# Patient Record
Sex: MALE | Race: BLACK OR AFRICAN AMERICAN | Employment: UNEMPLOYED | URBAN - METROPOLITAN AREA
[De-identification: names, ages, dates, MRNs, and addresses within clinical notes are randomized per-mention and may not be internally consistent; named-entity substitution may affect disease eponyms.]

---

## 2018-12-07 ENCOUNTER — HOSPITAL ENCOUNTER (OUTPATIENT)
Age: 48
Discharge: HOME | End: 2018-12-09
Payer: COMMERCIAL

## 2018-12-07 ENCOUNTER — HOSPITAL ENCOUNTER (OUTPATIENT)
Dept: GENERAL RADIOLOGY | Age: 48
Discharge: HOME | End: 2018-12-09
Payer: COMMERCIAL

## 2018-12-07 ENCOUNTER — HOSPITAL ENCOUNTER (OUTPATIENT)
Age: 48
Discharge: HOME | End: 2018-12-07
Payer: COMMERCIAL

## 2018-12-07 DIAGNOSIS — R52 PAIN: ICD-10-CM

## 2018-12-07 LAB
ALBUMIN SERPL-MCNC: 4.4 G/DL (ref 3.5–5.2)
ALP BLD-CCNC: 74 U/L (ref 40–129)
ALT SERPL-CCNC: 32 U/L (ref 0–40)
ANION GAP SERPL CALCULATED.3IONS-SCNC: 11 MMOL/L (ref 7–16)
AST SERPL-CCNC: 27 U/L (ref 0–39)
BASOPHILS ABSOLUTE: 0.05 E9/L (ref 0–0.2)
BASOPHILS RELATIVE PERCENT: 0.5 % (ref 0–2)
BILIRUB SERPL-MCNC: 0.5 MG/DL (ref 0–1.2)
BUN BLDV-MCNC: 9 MG/DL (ref 6–20)
CALCIUM SERPL-MCNC: 9.1 MG/DL (ref 8.6–10.2)
CHLORIDE BLD-SCNC: 106 MMOL/L (ref 98–107)
CHOLESTEROL, FASTING: 174 MG/DL (ref 0–199)
CO2: 24 MMOL/L (ref 22–29)
CREAT SERPL-MCNC: 1.1 MG/DL (ref 0.7–1.2)
EOSINOPHILS ABSOLUTE: 0.11 E9/L (ref 0.05–0.5)
EOSINOPHILS RELATIVE PERCENT: 1 % (ref 0–6)
GFR AFRICAN AMERICAN: >60
GFR NON-AFRICAN AMERICAN: >60 ML/MIN/1.73
GLUCOSE FASTING: 81 MG/DL (ref 74–99)
HCT VFR BLD CALC: 46.7 % (ref 37–54)
HDLC SERPL-MCNC: 50 MG/DL
HEMOGLOBIN: 15.5 G/DL (ref 12.5–16.5)
IMMATURE GRANULOCYTES #: 0.03 E9/L
IMMATURE GRANULOCYTES %: 0.3 % (ref 0–5)
LDL CHOLESTEROL CALCULATED: 108 MG/DL (ref 0–99)
LYMPHOCYTES ABSOLUTE: 3.03 E9/L (ref 1.5–4)
LYMPHOCYTES RELATIVE PERCENT: 27.8 % (ref 20–42)
MCH RBC QN AUTO: 26.8 PG (ref 26–35)
MCHC RBC AUTO-ENTMCNC: 33.2 % (ref 32–34.5)
MCV RBC AUTO: 80.7 FL (ref 80–99.9)
MONOCYTES ABSOLUTE: 0.82 E9/L (ref 0.1–0.95)
MONOCYTES RELATIVE PERCENT: 7.5 % (ref 2–12)
NEUTROPHILS ABSOLUTE: 6.84 E9/L (ref 1.8–7.3)
NEUTROPHILS RELATIVE PERCENT: 62.9 % (ref 43–80)
PDW BLD-RTO: 13.9 FL (ref 11.5–15)
PLATELET # BLD: 304 E9/L (ref 130–450)
PMV BLD AUTO: 9.7 FL (ref 7–12)
POTASSIUM SERPL-SCNC: 4.4 MMOL/L (ref 3.5–5)
RBC # BLD: 5.79 E12/L (ref 3.8–5.8)
SODIUM BLD-SCNC: 141 MMOL/L (ref 132–146)
TOTAL PROTEIN: 7.7 G/DL (ref 6.4–8.3)
TRIGLYCERIDE, FASTING: 80 MG/DL (ref 0–149)
VLDLC SERPL CALC-MCNC: 16 MG/DL
WBC # BLD: 10.9 E9/L (ref 4.5–11.5)

## 2018-12-07 PROCEDURE — 85025 COMPLETE CBC W/AUTO DIFF WBC: CPT

## 2018-12-07 PROCEDURE — G0103 PSA SCREENING: HCPCS

## 2018-12-07 PROCEDURE — 73030 X-RAY EXAM OF SHOULDER: CPT

## 2018-12-07 PROCEDURE — 71046 X-RAY EXAM CHEST 2 VIEWS: CPT

## 2018-12-07 PROCEDURE — 80053 COMPREHEN METABOLIC PANEL: CPT

## 2018-12-07 PROCEDURE — 36415 COLL VENOUS BLD VENIPUNCTURE: CPT

## 2018-12-07 PROCEDURE — 80061 LIPID PANEL: CPT

## 2018-12-08 LAB — PROSTATE SPECIFIC ANTIGEN: 1.16 NG/ML (ref 0–4)

## 2018-12-19 ENCOUNTER — HOSPITAL ENCOUNTER (OUTPATIENT)
Dept: CT IMAGING | Age: 48
Discharge: HOME | End: 2018-12-21
Payer: COMMERCIAL

## 2018-12-19 DIAGNOSIS — C34.00 MALIGNANT NEOPLASM OF MAIN BRONCHUS, UNSPECIFIED LATERALITY (HCC): ICD-10-CM

## 2018-12-19 PROCEDURE — 6360000004 HC RX CONTRAST MEDICATION: Performed by: PHYSICIAN ASSISTANT

## 2018-12-19 PROCEDURE — 71260 CT THORAX DX C+: CPT

## 2018-12-19 PROCEDURE — 2580000003 HC RX 258: Performed by: FAMILY MEDICINE

## 2018-12-19 RX ORDER — SODIUM CHLORIDE 0.9 % (FLUSH) 0.9 %
10 SYRINGE (ML) INJECTION PRN
Status: DISCONTINUED | OUTPATIENT
Start: 2018-12-19 | End: 2018-12-22 | Stop reason: HOSPADM

## 2018-12-19 RX ADMIN — Medication 10 ML: at 11:12

## 2018-12-19 RX ADMIN — IOPAMIDOL 90 ML: 755 INJECTION, SOLUTION INTRAVENOUS at 11:11

## 2018-12-28 ENCOUNTER — OFFICE VISIT (OUTPATIENT)
Dept: PULMONOLOGY | Age: 48
End: 2018-12-28
Payer: COMMERCIAL

## 2018-12-28 ENCOUNTER — TELEPHONE (OUTPATIENT)
Dept: PULMONOLOGY | Age: 48
End: 2018-12-28

## 2018-12-28 ENCOUNTER — HOSPITAL ENCOUNTER (OUTPATIENT)
Age: 48
Discharge: HOME | End: 2018-12-28
Payer: COMMERCIAL

## 2018-12-28 DIAGNOSIS — C34.11 PANCOAST TUMOR OF RIGHT LUNG (HCC): Primary | ICD-10-CM

## 2018-12-28 DIAGNOSIS — I20.0 UNSTABLE ANGINA PECTORIS (HCC): ICD-10-CM

## 2018-12-28 DIAGNOSIS — R05.9 COUGH: ICD-10-CM

## 2018-12-28 DIAGNOSIS — R91.8 LUNG MASS: ICD-10-CM

## 2018-12-28 DIAGNOSIS — F17.200 SMOKER: ICD-10-CM

## 2018-12-28 DIAGNOSIS — C34.11 PANCOAST TUMOR OF RIGHT LUNG (HCC): ICD-10-CM

## 2018-12-28 DIAGNOSIS — R91.8 LUNG MASS: Primary | ICD-10-CM

## 2018-12-28 PROBLEM — R07.9 CHEST PAIN: Status: ACTIVE | Noted: 2018-12-28

## 2018-12-28 LAB
DLCO %PRED: NORMAL %
DLCO PRED: 36.37 ML/MIN/MMHG
DLCO/VA %PRED: NORMAL %
DLCO/VA PRED: 4.81 ML/MIN/MMHG
DLCO/VA: 3.9 ML/MIN/MMHG
DLCO: 27.63 ML/MIN/MMHG
EXPIRATORY TIME-POST: 7.5 SEC
EXPIRATORY TIME: 7.75 SEC
FEF 25-75% %CHNG: NORMAL
FEF 25-75% %PRED-POST: NORMAL %
FEF 25-75% %PRED-PRE: NORMAL L/SEC
FEF 25-75% PRED: 4.02 L/SEC
FEF 25-75%-POST: 3.24 L/SEC
FEF 25-75%-PRE: 3.17 L/SEC
FEV1 %PRED-POST: NORMAL %
FEV1 %PRED-PRE: NORMAL %
FEV1 PRED: 4.41 L
FEV1-POST: 3.61 L
FEV1-PRE: 3.8 L
FEV1/FVC %PRED-POST: NORMAL %
FEV1/FVC %PRED-PRE: NORMAL %
FEV1/FVC PRED: 79 %
FEV1/FVC-POST: 71 %
FEV1/FVC-PRE: 77 %
FVC %PRED-POST: NORMAL L
FVC %PRED-PRE: NORMAL %
FVC PRED: 5.61 L
FVC-POST: 5.08 L
FVC-PRE: 4.91 L
GAW %PRED: NORMAL %
GAW PRED: NORMAL L/S/CMH2O
GAW: NORMAL L/S/CMH2O
IC %PRED: NORMAL %
IC PRED: 3.73 L
IC: 3.44 L
INR BLD: 1
MVV %PRED-PRE: NORMAL %
MVV PRED: 167 L/MIN
MVV-PRE: 158 L/MIN
PEF %PRED-POST: NORMAL %
PEF %PRED-PRE: NORMAL L/SEC
PEF PRED: NORMAL L/SEC
PEF%CHNG: NORMAL
PEF-POST: NORMAL L/SEC
PEF-PRE: NORMAL L/SEC
PROTHROMBIN TIME: 11.6 SEC (ref 9.3–12.4)
RAW %PRED: NORMAL %
RAW PRED: NORMAL CMH2O/L/S
RAW: NORMAL CMH2O/L/S
RV %PRED: NORMAL %
RV PRED: 2.14 L
RV: 4.43 L
SVC %PRED: NORMAL %
SVC PRED: 5.61 L
SVC: 4.88 L
TLC %PRED: NORMAL %
TLC PRED: 2.14 L
TLC: 9.58 L
VA %PRED: NORMAL %
VA PRED: 7.56 L
VA: 7.09 L
VTG %PRED: NORMAL %
VTG PRED: NORMAL L
VTG: NORMAL L

## 2018-12-28 PROCEDURE — 4004F PT TOBACCO SCREEN RCVD TLK: CPT | Performed by: INTERNAL MEDICINE

## 2018-12-28 PROCEDURE — G8427 DOCREV CUR MEDS BY ELIG CLIN: HCPCS | Performed by: INTERNAL MEDICINE

## 2018-12-28 PROCEDURE — 99205 OFFICE O/P NEW HI 60 MIN: CPT | Performed by: INTERNAL MEDICINE

## 2018-12-28 PROCEDURE — G8484 FLU IMMUNIZE NO ADMIN: HCPCS | Performed by: INTERNAL MEDICINE

## 2018-12-28 PROCEDURE — 36415 COLL VENOUS BLD VENIPUNCTURE: CPT

## 2018-12-28 PROCEDURE — 85610 PROTHROMBIN TIME: CPT

## 2018-12-28 PROCEDURE — 99203 OFFICE O/P NEW LOW 30 MIN: CPT | Performed by: INTERNAL MEDICINE

## 2018-12-28 PROCEDURE — G8599 NO ASA/ANTIPLAT THER USE RNG: HCPCS | Performed by: INTERNAL MEDICINE

## 2018-12-28 PROCEDURE — G8421 BMI NOT CALCULATED: HCPCS | Performed by: INTERNAL MEDICINE

## 2018-12-28 PROCEDURE — 94060 EVALUATION OF WHEEZING: CPT | Performed by: INTERNAL MEDICINE

## 2018-12-28 RX ORDER — OXYCODONE HYDROCHLORIDE AND ACETAMINOPHEN 5; 325 MG/1; MG/1
1 TABLET ORAL EVERY 6 HOURS PRN
Qty: 60 TABLET | Refills: 0 | Status: SHIPPED | OUTPATIENT
Start: 2018-12-28 | End: 2019-01-27

## 2018-12-28 ASSESSMENT — PULMONARY FUNCTION TESTS
FVC_PREDICTED: 5.61
FEV1_PREDICTED: 4.41
FVC_PRE: 4.91
FEV1_PRE: 3.80
FEV1_POST: 3.61
FEV1/FVC_POST: 71
FEV1/FVC_PREDICTED: 79
FVC_POST: 5.08
FEV1/FVC_PRE: 77

## 2018-12-28 NOTE — PATIENT INSTRUCTIONS
Patient Education        Percutaneous Lung Biopsy: Before Your Procedure  What is a percutaneous lung biopsy? A percutaneous (say \"per-juliannew-ORTIZ-nee-us\") lung biopsy is a procedure to take a sample of lung tissue. The doctor puts a long needle through your chest to do this. Another doctor looks at the sample tissue with a microscope to check for infection, cancer, or other lung problems. This procedure is also called a needle biopsy. Before the procedure, you may get medicine to help you relax. Then the doctor gives you a shot of numbing medicine in the skin where the needle will go. Next, the doctor makes a very small cut in the numbed skin. This cut is called an incision. He or she puts the needle through the incision into your lung. X-ray, ultrasound, or CT scan pictures help guide the needle into the correct spot. After the doctor takes a sample of lung tissue, he or she removes the needle. The doctor or a nurse puts a bandage over the incision and may put pressure on the area. You will lie on your side. The procedure usually takes 30 to 60 minutes. But the needle will only be in your lung for a few seconds. You will probably go home several hours after the procedure. It can take several days to get the results of the biopsy. The doctor or nurse will discuss the results with you. Follow-up care is a key part of your treatment and safety. Be sure to make and go to all appointments, and call your doctor if you are having problems. It's also a good idea to know your test results and keep a list of the medicines you take. What happens before the procedure?   Preparing for the procedure    · Understand exactly what procedure is planned, along with the risks, benefits, and other options. · Tell your doctors ALL the medicines, vitamins, supplements, and herbal remedies you take.  Some of these can increase the risk of bleeding or interact with anesthesia.     · If you take blood thinners, such as warfarin (Coumadin), clopidogrel (Plavix), or aspirin, be sure to talk to your doctor. He or she will tell you if you should stop taking these medicines before your procedure. Make sure that you understand exactly what your doctor wants you to do.     · Your doctor will tell you which medicines to take or stop before your procedure. You may need to stop taking certain medicines a week or more before the procedure. So talk to your doctor as soon as you can.     · If you have an advance directive, let your doctor know. It may include a living will and a durable power of  for health care. Bring a copy to the hospital. If you don't have one, you may want to prepare one. It lets your doctor and loved ones know your health care wishes. Doctors advise that everyone prepare these papers before any type of surgery or procedure. Procedures can be stressful. This information will help you understand what you can expect. And it will help you safely prepare for your procedure. What happens on the day of the procedure? · Follow the instructions exactly about when to stop eating and drinking. If you don't, your procedure may be canceled. If your doctor told you to take your medicines on the day of the procedure, take them with only a sip of water.     · Take a bath or shower before you come in for your procedure. Do not apply lotions, perfumes, deodorants, or nail polish.     · Take off all jewelry and piercings. And take out contact lenses, if you wear them.    At the hospital or surgery center   · Bring a picture ID.     · You will be kept comfortable and safe by your anesthesia provider. You may get medicine that relaxes you or puts you in a light sleep. The area being worked on will be numb.     · The procedure will take 30 to 60 minutes.     · After, you will need to lie on your side for at least 1 hour.  This can help stop bleeding from the area of the lung where the biopsy was done.     · The doctor will take another X-ray of your lungs. Going home   · Be sure you have someone to drive you home. Anesthesia and pain medicine make it unsafe for you to drive.     · You will be given more specific instructions about recovering from your procedure. They will cover things like diet, wound care, follow-up care, driving, and getting back to your normal routine. When should you call your doctor? · You have questions or concerns.     · You don't understand how to prepare for your procedure.     · You become ill before the procedure (such as fever, flu, or a cold).     · You need to reschedule or have changed your mind about having the procedure. Where can you learn more? Go to [email or web address] org and sign in to your gDine account. Enter S852 in the optionsXpress box to learn more about \"Percutaneous Lung Biopsy: Before Your Procedure. \"     If you do not have an account, please click on the \"Sign Up Now\" link. Current as of: December 6, 2017  Content Version: 11.8  © 0765-0365 [employer], [employer]orporated. Care instructions adapted under license by [facility] ([facility]). If you have questions about a medical condition or this instruction, always ask your healthcare professional. [place] [address] any warranty or liability for your use of this information.

## 2018-12-28 NOTE — PROGRESS NOTES
The patient has no pets. Hobbies include sport and cole. The patient denies excessive alcohol intake. He moved back from [place] to be near family. He works in [employer]. He is single with children. SOCIAL HISTORY: Age-appropriate past and current activities are:  Social History   Substance Use Topics    Smoking status: Current Every Day Smoker     Packs/day: 1.00    Smokeless tobacco: Not on file    Alcohol use No       SURGICAL HISTORY: History reviewed. No pertinent surgical history. FAMILY HISTORY: A review of medical events in the patient's family , including disease which may be hereditary or place the patient at risk were reviewed. He biological father is . His mother is alive. He has a mixed family. Sister has diabetes. History reviewed. No pertinent family history. No family status information on file. REVIEW OF SYSTEMS: The patients health assessment form was reviewed. Constitutional: General health fair . The patient complains of weight changes. The patient denies any recent fevers or weakness. Head: Patient denies any history of trauma, convulsive disorder or syncope. Skin:  Patient denies history of changes in pigmentation, eruptions or pruritus. Eyes: Patient denies any history of color blindness, photophobia, diplopia, inflammation,. He  denies cataracts. Denies glaucoma. Ears: Patient denies history of deafness, tinnitus, pain, discharge or recurrent infections. Nose/Throat: Patient denies history of rhinitis, chronic nasal discharge, drainage, epistaxis, obstruction or nasal polyps. Patient denies history of soreness of mouth or tongue. Patient denies history of hoarseness, voice changes, sore throats or recurrent tonsillitis. Lymphatic:  Patient denies history of enlargement, inflammation, pain or suppuration. He denies history of lymphoma. Cardiovascular:  Patient denies history of palpations, heart murmur, irregular rhythm, chest pain.   He

## 2019-01-02 ENCOUNTER — TELEPHONE (OUTPATIENT)
Dept: PULMONOLOGY | Age: 49
End: 2019-01-02

## 2019-01-07 ENCOUNTER — HOSPITAL ENCOUNTER (OUTPATIENT)
Dept: MRI IMAGING | Age: 49
Discharge: HOME | End: 2019-01-09
Payer: COMMERCIAL

## 2019-01-07 DIAGNOSIS — R91.8 LUNG MASS: ICD-10-CM

## 2019-01-07 DIAGNOSIS — C34.11 PANCOAST TUMOR OF RIGHT LUNG (HCC): ICD-10-CM

## 2019-01-07 DIAGNOSIS — F17.200 SMOKER: ICD-10-CM

## 2019-01-07 PROCEDURE — 6360000004 HC RX CONTRAST MEDICATION: Performed by: RADIOLOGY

## 2019-01-07 PROCEDURE — 73220 MRI UPPR EXTREMITY W/O&W/DYE: CPT

## 2019-01-07 PROCEDURE — A9577 INJ MULTIHANCE: HCPCS | Performed by: RADIOLOGY

## 2019-01-07 RX ADMIN — GADOBENATE DIMEGLUMINE 20 ML: 529 INJECTION, SOLUTION INTRAVENOUS at 17:40

## 2019-01-14 ENCOUNTER — TELEPHONE (OUTPATIENT)
Dept: RADIATION ONCOLOGY | Age: 49
End: 2019-01-14

## 2019-01-14 ENCOUNTER — HOSPITAL ENCOUNTER (OUTPATIENT)
Dept: RADIATION ONCOLOGY | Age: 49
Discharge: HOME | End: 2019-01-14
Payer: COMMERCIAL

## 2019-01-14 VITALS
WEIGHT: 215.3 LBS | TEMPERATURE: 97.4 F | HEART RATE: 66 BPM | BODY MASS INDEX: 28.41 KG/M2 | DIASTOLIC BLOOD PRESSURE: 80 MMHG | SYSTOLIC BLOOD PRESSURE: 138 MMHG

## 2019-01-14 DIAGNOSIS — R91.8 LUNG MASS: Primary | ICD-10-CM

## 2019-01-14 PROCEDURE — 99205 OFFICE O/P NEW HI 60 MIN: CPT

## 2019-01-14 PROCEDURE — 99205 OFFICE O/P NEW HI 60 MIN: CPT | Performed by: RADIOLOGY

## 2019-01-14 SDOH — ECONOMIC STABILITY: HOUSING INSECURITY: PLEASE ASSESS YOUR PATIENT'S LEVEL OF DISTRESS CONCERNING HOUSING (SCALE FROM 1-10): 0 (NONE)

## 2019-01-14 ASSESSMENT — PAIN DESCRIPTION - LOCATION: LOCATION: SHOULDER

## 2019-01-14 ASSESSMENT — PAIN SCALES - GENERAL: PAINLEVEL_OUTOF10: 8

## 2019-01-14 ASSESSMENT — PAIN DESCRIPTION - ORIENTATION: ORIENTATION: RIGHT

## 2019-01-15 RX ORDER — SODIUM CHLORIDE 0.9 % (FLUSH) 0.9 %
10 SYRINGE (ML) INJECTION PRN
Status: CANCELLED | OUTPATIENT
Start: 2019-01-16

## 2019-01-16 ENCOUNTER — ANESTHESIA (OUTPATIENT)
Dept: CT IMAGING | Age: 49
End: 2019-01-16

## 2019-01-16 ENCOUNTER — HOSPITAL ENCOUNTER (OUTPATIENT)
Dept: GENERAL RADIOLOGY | Age: 49
Discharge: HOME | End: 2019-01-18
Payer: COMMERCIAL

## 2019-01-16 ENCOUNTER — TELEPHONE (OUTPATIENT)
Dept: RADIATION ONCOLOGY | Age: 49
End: 2019-01-16

## 2019-01-16 ENCOUNTER — HOSPITAL ENCOUNTER (OUTPATIENT)
Dept: CT IMAGING | Age: 49
Discharge: HOME | End: 2019-01-18
Payer: COMMERCIAL

## 2019-01-16 ENCOUNTER — ANESTHESIA EVENT (OUTPATIENT)
Dept: CT IMAGING | Age: 49
End: 2019-01-16

## 2019-01-16 VITALS
SYSTOLIC BLOOD PRESSURE: 155 MMHG | RESPIRATION RATE: 20 BRPM | OXYGEN SATURATION: 100 % | DIASTOLIC BLOOD PRESSURE: 101 MMHG

## 2019-01-16 VITALS
TEMPERATURE: 97.6 F | DIASTOLIC BLOOD PRESSURE: 79 MMHG | BODY MASS INDEX: 28.49 KG/M2 | OXYGEN SATURATION: 97 % | RESPIRATION RATE: 18 BRPM | HEART RATE: 62 BPM | HEIGHT: 73 IN | SYSTOLIC BLOOD PRESSURE: 142 MMHG | WEIGHT: 215 LBS

## 2019-01-16 DIAGNOSIS — I20.0 UNSTABLE ANGINA PECTORIS (HCC): ICD-10-CM

## 2019-01-16 DIAGNOSIS — R91.8 LUNG MASS: ICD-10-CM

## 2019-01-16 LAB
HCT VFR BLD CALC: 43.5 % (ref 37–54)
HEMOGLOBIN: 14.7 G/DL (ref 12.5–16.5)
MCH RBC QN AUTO: 26.6 PG (ref 26–35)
MCHC RBC AUTO-ENTMCNC: 33.8 % (ref 32–34.5)
MCV RBC AUTO: 78.7 FL (ref 80–99.9)
PDW BLD-RTO: 13.5 FL (ref 11.5–15)
PLATELET # BLD: 293 E9/L (ref 130–450)
PMV BLD AUTO: 9.2 FL (ref 7–12)
RBC # BLD: 5.53 E12/L (ref 3.8–5.8)
WBC # BLD: 11.6 E9/L (ref 4.5–11.5)

## 2019-01-16 PROCEDURE — 88342 IMHCHEM/IMCYTCHM 1ST ANTB: CPT

## 2019-01-16 PROCEDURE — 2500000003 HC RX 250 WO HCPCS: Performed by: RADIOLOGY

## 2019-01-16 PROCEDURE — 36415 COLL VENOUS BLD VENIPUNCTURE: CPT

## 2019-01-16 PROCEDURE — 3700000000 HC ANESTHESIA ATTENDED CARE

## 2019-01-16 PROCEDURE — 85027 COMPLETE CBC AUTOMATED: CPT

## 2019-01-16 PROCEDURE — 88305 TISSUE EXAM BY PATHOLOGIST: CPT

## 2019-01-16 PROCEDURE — 2580000003 HC RX 258: Performed by: NURSE ANESTHETIST, CERTIFIED REGISTERED

## 2019-01-16 PROCEDURE — 88341 IMHCHEM/IMCYTCHM EA ADD ANTB: CPT

## 2019-01-16 PROCEDURE — 71045 X-RAY EXAM CHEST 1 VIEW: CPT

## 2019-01-16 PROCEDURE — 6370000000 HC RX 637 (ALT 250 FOR IP): Performed by: RADIOLOGY

## 2019-01-16 PROCEDURE — 3700000001 HC ADD 15 MINUTES (ANESTHESIA)

## 2019-01-16 PROCEDURE — 77012 CT SCAN FOR NEEDLE BIOPSY: CPT

## 2019-01-16 PROCEDURE — 2709999900 CT NEEDLE BIOPSY LUNG PERCUTANEOUS

## 2019-01-16 PROCEDURE — 7100000010 HC PHASE II RECOVERY - FIRST 15 MIN

## 2019-01-16 PROCEDURE — 6360000002 HC RX W HCPCS: Performed by: NURSE ANESTHETIST, CERTIFIED REGISTERED

## 2019-01-16 PROCEDURE — 7100000011 HC PHASE II RECOVERY - ADDTL 15 MIN

## 2019-01-16 RX ORDER — MIDAZOLAM HYDROCHLORIDE 1 MG/ML
INJECTION INTRAMUSCULAR; INTRAVENOUS PRN
Status: DISCONTINUED | OUTPATIENT
Start: 2019-01-16 | End: 2019-01-16 | Stop reason: SDUPTHER

## 2019-01-16 RX ORDER — FENTANYL CITRATE 50 UG/ML
INJECTION, SOLUTION INTRAMUSCULAR; INTRAVENOUS PRN
Status: DISCONTINUED | OUTPATIENT
Start: 2019-01-16 | End: 2019-01-16 | Stop reason: SDUPTHER

## 2019-01-16 RX ORDER — HYDROCODONE BITARTRATE AND ACETAMINOPHEN 5; 325 MG/1; MG/1
2 TABLET ORAL PRN
Status: ACTIVE | OUTPATIENT
Start: 2019-01-16 | End: 2019-01-16

## 2019-01-16 RX ORDER — HYDROCODONE BITARTRATE AND ACETAMINOPHEN 5; 325 MG/1; MG/1
1 TABLET ORAL PRN
Status: ACTIVE | OUTPATIENT
Start: 2019-01-16 | End: 2019-01-16

## 2019-01-16 RX ORDER — IBUPROFEN 200 MG
200 TABLET ORAL EVERY 6 HOURS PRN
COMMUNITY

## 2019-01-16 RX ORDER — SODIUM CHLORIDE 0.9 % (FLUSH) 0.9 %
10 SYRINGE (ML) INJECTION PRN
Status: DISCONTINUED | OUTPATIENT
Start: 2019-01-16 | End: 2019-01-19 | Stop reason: HOSPADM

## 2019-01-16 RX ORDER — LIDOCAINE HYDROCHLORIDE 20 MG/ML
20 INJECTION, SOLUTION EPIDURAL; INFILTRATION; INTRACAUDAL; PERINEURAL ONCE
Status: COMPLETED | OUTPATIENT
Start: 2019-01-16 | End: 2019-01-16

## 2019-01-16 RX ORDER — PROPOFOL 10 MG/ML
INJECTION, EMULSION INTRAVENOUS CONTINUOUS PRN
Status: DISCONTINUED | OUTPATIENT
Start: 2019-01-16 | End: 2019-01-16 | Stop reason: SDUPTHER

## 2019-01-16 RX ORDER — SODIUM CHLORIDE 9 MG/ML
INJECTION, SOLUTION INTRAVENOUS CONTINUOUS PRN
Status: DISCONTINUED | OUTPATIENT
Start: 2019-01-16 | End: 2019-01-16 | Stop reason: SDUPTHER

## 2019-01-16 RX ADMIN — FENTANYL CITRATE 50 MCG: 50 INJECTION, SOLUTION INTRAMUSCULAR; INTRAVENOUS at 11:50

## 2019-01-16 RX ADMIN — Medication: at 11:53

## 2019-01-16 RX ADMIN — LIDOCAINE HYDROCHLORIDE 20 ML: 20 INJECTION, SOLUTION EPIDURAL; INFILTRATION; INTRACAUDAL; PERINEURAL at 12:32

## 2019-01-16 RX ADMIN — MIDAZOLAM HYDROCHLORIDE 2 MG: 1 INJECTION, SOLUTION INTRAMUSCULAR; INTRAVENOUS at 10:42

## 2019-01-16 RX ADMIN — SODIUM CHLORIDE: 9 INJECTION, SOLUTION INTRAVENOUS at 10:38

## 2019-01-16 RX ADMIN — PROPOFOL 80 MCG/KG/MIN: 10 INJECTION, EMULSION INTRAVENOUS at 10:47

## 2019-01-16 RX ADMIN — FENTANYL CITRATE 50 MCG: 50 INJECTION, SOLUTION INTRAMUSCULAR; INTRAVENOUS at 11:25

## 2019-01-16 ASSESSMENT — PAIN - FUNCTIONAL ASSESSMENT: PAIN_FUNCTIONAL_ASSESSMENT: 0-10

## 2019-01-16 ASSESSMENT — LIFESTYLE VARIABLES: SMOKING_STATUS: 1

## 2019-01-17 ENCOUNTER — TELEPHONE (OUTPATIENT)
Dept: RADIATION ONCOLOGY | Age: 49
End: 2019-01-17

## 2019-01-22 ENCOUNTER — HOSPITAL ENCOUNTER (OUTPATIENT)
Dept: NUCLEAR MEDICINE | Age: 49
Discharge: HOME | End: 2019-01-24
Payer: COMMERCIAL

## 2019-01-22 ENCOUNTER — INITIAL CONSULT (OUTPATIENT)
Dept: CARDIOTHORACIC SURGERY | Age: 49
End: 2019-01-22
Payer: COMMERCIAL

## 2019-01-22 VITALS
OXYGEN SATURATION: 97 % | DIASTOLIC BLOOD PRESSURE: 90 MMHG | WEIGHT: 212.2 LBS | HEART RATE: 52 BPM | HEIGHT: 73 IN | SYSTOLIC BLOOD PRESSURE: 137 MMHG | BODY MASS INDEX: 28.12 KG/M2

## 2019-01-22 DIAGNOSIS — C34.11 PANCOAST TUMOR OF RIGHT LUNG (HCC): ICD-10-CM

## 2019-01-22 DIAGNOSIS — F17.200 SMOKER: ICD-10-CM

## 2019-01-22 DIAGNOSIS — C34.11 PANCOAST TUMOR OF RIGHT LUNG (HCC): Primary | ICD-10-CM

## 2019-01-22 DIAGNOSIS — R91.8 LUNG MASS: ICD-10-CM

## 2019-01-22 PROCEDURE — G8427 DOCREV CUR MEDS BY ELIG CLIN: HCPCS | Performed by: THORACIC SURGERY (CARDIOTHORACIC VASCULAR SURGERY)

## 2019-01-22 PROCEDURE — G8419 CALC BMI OUT NRM PARAM NOF/U: HCPCS | Performed by: THORACIC SURGERY (CARDIOTHORACIC VASCULAR SURGERY)

## 2019-01-22 PROCEDURE — 3430000000 HC RX DIAGNOSTIC RADIOPHARMACEUTICAL: Performed by: RADIOLOGY

## 2019-01-22 PROCEDURE — G8484 FLU IMMUNIZE NO ADMIN: HCPCS | Performed by: THORACIC SURGERY (CARDIOTHORACIC VASCULAR SURGERY)

## 2019-01-22 PROCEDURE — 4004F PT TOBACCO SCREEN RCVD TLK: CPT | Performed by: THORACIC SURGERY (CARDIOTHORACIC VASCULAR SURGERY)

## 2019-01-22 PROCEDURE — 78815 PET IMAGE W/CT SKULL-THIGH: CPT

## 2019-01-22 PROCEDURE — A9552 F18 FDG: HCPCS | Performed by: RADIOLOGY

## 2019-01-22 PROCEDURE — 99203 OFFICE O/P NEW LOW 30 MIN: CPT | Performed by: THORACIC SURGERY (CARDIOTHORACIC VASCULAR SURGERY)

## 2019-01-22 PROCEDURE — G8599 NO ASA/ANTIPLAT THER USE RNG: HCPCS | Performed by: THORACIC SURGERY (CARDIOTHORACIC VASCULAR SURGERY)

## 2019-01-22 RX ORDER — FLUDEOXYGLUCOSE F 18 200 MCI/ML
14.9 INJECTION, SOLUTION INTRAVENOUS
Status: COMPLETED | OUTPATIENT
Start: 2019-01-22 | End: 2019-01-22

## 2019-01-22 RX ADMIN — FLUDEOXYGLUCOSE F 18 14.9 MILLICURIE: 200 INJECTION, SOLUTION INTRAVENOUS at 07:53

## 2019-01-22 ASSESSMENT — ENCOUNTER SYMPTOMS
COUGH: 0
COLOR CHANGE: 0
ABDOMINAL PAIN: 0
CHEST TIGHTNESS: 0
SHORTNESS OF BREATH: 0

## 2019-01-23 ENCOUNTER — TELEPHONE (OUTPATIENT)
Dept: ONCOLOGY | Age: 49
End: 2019-01-23

## 2019-01-23 ENCOUNTER — HOSPITAL ENCOUNTER (OUTPATIENT)
Age: 49
Discharge: HOME | End: 2019-01-23
Payer: COMMERCIAL

## 2019-01-23 ENCOUNTER — OFFICE VISIT (OUTPATIENT)
Dept: ONCOLOGY | Age: 49
End: 2019-01-23
Payer: COMMERCIAL

## 2019-01-23 ENCOUNTER — HOSPITAL ENCOUNTER (OUTPATIENT)
Dept: RADIATION ONCOLOGY | Age: 49
Discharge: HOME | End: 2019-01-23
Payer: COMMERCIAL

## 2019-01-23 ENCOUNTER — TELEPHONE (OUTPATIENT)
Dept: RADIATION ONCOLOGY | Age: 49
End: 2019-01-23

## 2019-01-23 ENCOUNTER — HOSPITAL ENCOUNTER (OUTPATIENT)
Dept: INFUSION THERAPY | Age: 49
Discharge: HOME | End: 2019-01-23
Payer: COMMERCIAL

## 2019-01-23 VITALS
WEIGHT: 215.3 LBS | HEART RATE: 66 BPM | HEIGHT: 73 IN | RESPIRATION RATE: 20 BRPM | TEMPERATURE: 97.9 F | SYSTOLIC BLOOD PRESSURE: 125 MMHG | DIASTOLIC BLOOD PRESSURE: 81 MMHG | BODY MASS INDEX: 28.53 KG/M2

## 2019-01-23 DIAGNOSIS — R91.8 LUNG MASS: ICD-10-CM

## 2019-01-23 DIAGNOSIS — C34.10 PANCOAST TUMOR, UNSPECIFIED LATERALITY (HCC): Primary | ICD-10-CM

## 2019-01-23 DIAGNOSIS — Z09 FOLLOW UP: Primary | ICD-10-CM

## 2019-01-23 DIAGNOSIS — R91.8 LUNG MASS: Primary | ICD-10-CM

## 2019-01-23 DIAGNOSIS — C34.10 PANCOAST TUMOR, UNSPECIFIED LATERALITY (HCC): ICD-10-CM

## 2019-01-23 LAB
ALBUMIN SERPL-MCNC: 4.3 G/DL (ref 3.5–5.2)
ALP BLD-CCNC: 83 U/L (ref 40–129)
ALT SERPL-CCNC: 24 U/L (ref 0–40)
ANION GAP SERPL CALCULATED.3IONS-SCNC: 10 MMOL/L (ref 7–16)
AST SERPL-CCNC: 21 U/L (ref 0–39)
BASOPHILS ABSOLUTE: 0.05 E9/L (ref 0–0.2)
BASOPHILS RELATIVE PERCENT: 0.5 % (ref 0–2)
BILIRUB SERPL-MCNC: 0.3 MG/DL (ref 0–1.2)
BUN BLDV-MCNC: 12 MG/DL (ref 6–20)
CALCIUM SERPL-MCNC: 9.2 MG/DL (ref 8.6–10.2)
CHLORIDE BLD-SCNC: 103 MMOL/L (ref 98–107)
CO2: 26 MMOL/L (ref 22–29)
CREAT SERPL-MCNC: 1.1 MG/DL (ref 0.7–1.2)
EOSINOPHILS ABSOLUTE: 0.23 E9/L (ref 0.05–0.5)
EOSINOPHILS RELATIVE PERCENT: 2.1 % (ref 0–6)
GFR AFRICAN AMERICAN: >60
GFR NON-AFRICAN AMERICAN: >60 ML/MIN/1.73
GLUCOSE BLD-MCNC: 82 MG/DL (ref 74–99)
HCT VFR BLD CALC: 44.5 % (ref 37–54)
HEMOGLOBIN: 15.2 G/DL (ref 12.5–16.5)
IMMATURE GRANULOCYTES #: 0.05 E9/L
IMMATURE GRANULOCYTES %: 0.5 % (ref 0–5)
LYMPHOCYTES ABSOLUTE: 2.51 E9/L (ref 1.5–4)
LYMPHOCYTES RELATIVE PERCENT: 22.7 % (ref 20–42)
MCH RBC QN AUTO: 27 PG (ref 26–35)
MCHC RBC AUTO-ENTMCNC: 34.2 % (ref 32–34.5)
MCV RBC AUTO: 79 FL (ref 80–99.9)
MONOCYTES ABSOLUTE: 0.85 E9/L (ref 0.1–0.95)
MONOCYTES RELATIVE PERCENT: 7.7 % (ref 2–12)
NEUTROPHILS ABSOLUTE: 7.39 E9/L (ref 1.8–7.3)
NEUTROPHILS RELATIVE PERCENT: 66.5 % (ref 43–80)
PDW BLD-RTO: 13.6 FL (ref 11.5–15)
PLATELET # BLD: 345 E9/L (ref 130–450)
PMV BLD AUTO: 9.2 FL (ref 7–12)
POTASSIUM SERPL-SCNC: 4.7 MMOL/L (ref 3.5–5)
RBC # BLD: 5.63 E12/L (ref 3.8–5.8)
SODIUM BLD-SCNC: 139 MMOL/L (ref 132–146)
TOTAL PROTEIN: 7.7 G/DL (ref 6.4–8.3)
WBC # BLD: 11.1 E9/L (ref 4.5–11.5)

## 2019-01-23 PROCEDURE — G8419 CALC BMI OUT NRM PARAM NOF/U: HCPCS | Performed by: INTERNAL MEDICINE

## 2019-01-23 PROCEDURE — 99214 OFFICE O/P EST MOD 30 MIN: CPT

## 2019-01-23 PROCEDURE — 77334 RADIATION TREATMENT AID(S): CPT

## 2019-01-23 PROCEDURE — 36415 COLL VENOUS BLD VENIPUNCTURE: CPT

## 2019-01-23 PROCEDURE — 80053 COMPREHEN METABOLIC PANEL: CPT

## 2019-01-23 PROCEDURE — 85025 COMPLETE CBC W/AUTO DIFF WBC: CPT

## 2019-01-23 PROCEDURE — 99244 OFF/OP CNSLTJ NEW/EST MOD 40: CPT | Performed by: INTERNAL MEDICINE

## 2019-01-23 PROCEDURE — G8484 FLU IMMUNIZE NO ADMIN: HCPCS | Performed by: INTERNAL MEDICINE

## 2019-01-23 PROCEDURE — G8427 DOCREV CUR MEDS BY ELIG CLIN: HCPCS | Performed by: INTERNAL MEDICINE

## 2019-01-23 RX ORDER — ONDANSETRON HYDROCHLORIDE 8 MG/1
4-8 TABLET, FILM COATED ORAL EVERY 8 HOURS PRN
Qty: 60 TABLET | Refills: 1 | Status: SHIPPED | OUTPATIENT
Start: 2019-01-23

## 2019-01-23 RX ORDER — LIDOCAINE AND PRILOCAINE 25; 25 MG/G; MG/G
CREAM TOPICAL
Qty: 30 G | Refills: 5 | Status: SHIPPED | OUTPATIENT
Start: 2019-01-23

## 2019-01-23 RX ORDER — PROCHLORPERAZINE MALEATE 10 MG
10 TABLET ORAL EVERY 6 HOURS PRN
Qty: 60 TABLET | Refills: 1 | Status: SHIPPED | OUTPATIENT
Start: 2019-01-23

## 2019-01-25 ENCOUNTER — TELEPHONE (OUTPATIENT)
Dept: SURGERY | Age: 49
End: 2019-01-25

## 2019-01-25 ENCOUNTER — TELEPHONE (OUTPATIENT)
Dept: CARDIOTHORACIC SURGERY | Age: 49
End: 2019-01-25

## 2019-01-28 ENCOUNTER — TELEPHONE (OUTPATIENT)
Dept: RADIATION ONCOLOGY | Age: 49
End: 2019-01-28

## 2019-01-29 ENCOUNTER — ANESTHESIA EVENT (OUTPATIENT)
Dept: OPERATING ROOM | Age: 49
End: 2019-01-29
Payer: COMMERCIAL

## 2019-01-29 ENCOUNTER — ANESTHESIA (OUTPATIENT)
Dept: OPERATING ROOM | Age: 49
End: 2019-01-29
Payer: COMMERCIAL

## 2019-01-29 ENCOUNTER — APPOINTMENT (OUTPATIENT)
Dept: GENERAL RADIOLOGY | Age: 49
End: 2019-01-29
Attending: SURGERY
Payer: COMMERCIAL

## 2019-01-29 ENCOUNTER — HOSPITAL ENCOUNTER (OUTPATIENT)
Age: 49
Setting detail: OUTPATIENT SURGERY
Discharge: HOME | End: 2019-01-29
Attending: SURGERY | Admitting: SURGERY
Payer: COMMERCIAL

## 2019-01-29 VITALS
SYSTOLIC BLOOD PRESSURE: 142 MMHG | DIASTOLIC BLOOD PRESSURE: 58 MMHG | HEART RATE: 73 BPM | TEMPERATURE: 97.2 F | OXYGEN SATURATION: 99 % | HEIGHT: 73 IN | WEIGHT: 215 LBS | RESPIRATION RATE: 15 BRPM | BODY MASS INDEX: 28.49 KG/M2

## 2019-01-29 VITALS
OXYGEN SATURATION: 96 % | RESPIRATION RATE: 20 BRPM | SYSTOLIC BLOOD PRESSURE: 100 MMHG | DIASTOLIC BLOOD PRESSURE: 66 MMHG

## 2019-01-29 DIAGNOSIS — C34.11 PANCOAST TUMOR OF RIGHT LUNG (HCC): Primary | ICD-10-CM

## 2019-01-29 PROCEDURE — C1788 PORT, INDWELLING, IMP: HCPCS | Performed by: SURGERY

## 2019-01-29 PROCEDURE — 77001 FLUOROGUIDE FOR VEIN DEVICE: CPT | Performed by: SURGERY

## 2019-01-29 PROCEDURE — 36561 INSERT TUNNELED CV CATH: CPT | Performed by: SURGERY

## 2019-01-29 PROCEDURE — 2500000003 HC RX 250 WO HCPCS: Performed by: SURGERY

## 2019-01-29 PROCEDURE — 7100000011 HC PHASE II RECOVERY - ADDTL 15 MIN: Performed by: SURGERY

## 2019-01-29 PROCEDURE — 3600000002 HC SURGERY LEVEL 2 BASE: Performed by: SURGERY

## 2019-01-29 PROCEDURE — 3600000012 HC SURGERY LEVEL 2 ADDTL 15MIN: Performed by: SURGERY

## 2019-01-29 PROCEDURE — 6360000002 HC RX W HCPCS: Performed by: SURGERY

## 2019-01-29 PROCEDURE — 3209999900 FLUORO FOR SURGICAL PROCEDURES

## 2019-01-29 PROCEDURE — 2580000003 HC RX 258: Performed by: SURGERY

## 2019-01-29 PROCEDURE — 2709999900 HC NON-CHARGEABLE SUPPLY: Performed by: SURGERY

## 2019-01-29 PROCEDURE — 71045 X-RAY EXAM CHEST 1 VIEW: CPT

## 2019-01-29 PROCEDURE — 7100000010 HC PHASE II RECOVERY - FIRST 15 MIN: Performed by: SURGERY

## 2019-01-29 PROCEDURE — 3700000000 HC ANESTHESIA ATTENDED CARE: Performed by: SURGERY

## 2019-01-29 PROCEDURE — 6360000002 HC RX W HCPCS: Performed by: NURSE ANESTHETIST, CERTIFIED REGISTERED

## 2019-01-29 PROCEDURE — 3700000001 HC ADD 15 MINUTES (ANESTHESIA): Performed by: SURGERY

## 2019-01-29 DEVICE — PORT INFUS OD2.7MM ID1.5MM INTRO 8FR TI POLYUR CATH DETACH CT80STPD] ANGIODYNAMICS INC]: Type: IMPLANTABLE DEVICE | Site: SUBCLAVIAN | Status: FUNCTIONAL

## 2019-01-29 RX ORDER — PROPOFOL 10 MG/ML
INJECTION, EMULSION INTRAVENOUS CONTINUOUS PRN
Status: DISCONTINUED | OUTPATIENT
Start: 2019-01-29 | End: 2019-01-29 | Stop reason: SDUPTHER

## 2019-01-29 RX ORDER — BUPIVACAINE HYDROCHLORIDE AND EPINEPHRINE 2.5; 5 MG/ML; UG/ML
INJECTION, SOLUTION EPIDURAL; INFILTRATION; INTRACAUDAL; PERINEURAL PRN
Status: DISCONTINUED | OUTPATIENT
Start: 2019-01-29 | End: 2019-01-29 | Stop reason: HOSPADM

## 2019-01-29 RX ORDER — HYDROCODONE BITARTRATE AND ACETAMINOPHEN 5; 325 MG/1; MG/1
1 TABLET ORAL EVERY 4 HOURS PRN
Qty: 18 TABLET | Refills: 0 | Status: SHIPPED | OUTPATIENT
Start: 2019-01-29 | End: 2019-02-01

## 2019-01-29 RX ORDER — FENTANYL CITRATE 50 UG/ML
INJECTION, SOLUTION INTRAMUSCULAR; INTRAVENOUS PRN
Status: DISCONTINUED | OUTPATIENT
Start: 2019-01-29 | End: 2019-01-29 | Stop reason: SDUPTHER

## 2019-01-29 RX ORDER — MIDAZOLAM HYDROCHLORIDE 1 MG/ML
INJECTION INTRAMUSCULAR; INTRAVENOUS PRN
Status: DISCONTINUED | OUTPATIENT
Start: 2019-01-29 | End: 2019-01-29 | Stop reason: SDUPTHER

## 2019-01-29 RX ORDER — CEFAZOLIN SODIUM 2 G/50ML
2 SOLUTION INTRAVENOUS ONCE
Status: COMPLETED | OUTPATIENT
Start: 2019-01-29 | End: 2019-01-29

## 2019-01-29 RX ORDER — LIDOCAINE HYDROCHLORIDE 10 MG/ML
INJECTION, SOLUTION EPIDURAL; INFILTRATION; INTRACAUDAL; PERINEURAL PRN
Status: DISCONTINUED | OUTPATIENT
Start: 2019-01-29 | End: 2019-01-29 | Stop reason: HOSPADM

## 2019-01-29 RX ORDER — SODIUM CHLORIDE 9 MG/ML
INJECTION, SOLUTION INTRAVENOUS CONTINUOUS
Status: DISCONTINUED | OUTPATIENT
Start: 2019-01-29 | End: 2019-01-29 | Stop reason: HOSPADM

## 2019-01-29 RX ADMIN — PROPOFOL 100 MCG/KG/MIN: 10 INJECTION, EMULSION INTRAVENOUS at 12:53

## 2019-01-29 RX ADMIN — FENTANYL CITRATE 50 MCG: 50 INJECTION, SOLUTION INTRAMUSCULAR; INTRAVENOUS at 12:53

## 2019-01-29 RX ADMIN — CEFAZOLIN SODIUM 2 G: 2 SOLUTION INTRAVENOUS at 12:55

## 2019-01-29 RX ADMIN — SODIUM CHLORIDE: 9 INJECTION, SOLUTION INTRAVENOUS at 11:39

## 2019-01-29 RX ADMIN — FENTANYL CITRATE 50 MCG: 50 INJECTION, SOLUTION INTRAMUSCULAR; INTRAVENOUS at 13:00

## 2019-01-29 RX ADMIN — MIDAZOLAM HYDROCHLORIDE 2 MG: 1 INJECTION, SOLUTION INTRAMUSCULAR; INTRAVENOUS at 12:49

## 2019-01-29 RX ADMIN — SODIUM CHLORIDE: 9 INJECTION, SOLUTION INTRAVENOUS at 12:49

## 2019-01-29 ASSESSMENT — LIFESTYLE VARIABLES: SMOKING_STATUS: 1

## 2019-01-29 ASSESSMENT — PAIN SCALES - GENERAL
PAINLEVEL_OUTOF10: 0

## 2019-01-29 ASSESSMENT — PAIN - FUNCTIONAL ASSESSMENT: PAIN_FUNCTIONAL_ASSESSMENT: 0-10

## 2019-01-30 ENCOUNTER — TELEPHONE (OUTPATIENT)
Dept: CARDIOTHORACIC SURGERY | Age: 49
End: 2019-01-30

## 2019-02-01 ENCOUNTER — TELEPHONE (OUTPATIENT)
Dept: CASE MANAGEMENT | Age: 49
End: 2019-02-01

## 2019-02-01 ENCOUNTER — HOSPITAL ENCOUNTER (OUTPATIENT)
Dept: RADIATION ONCOLOGY | Age: 49
Discharge: HOME | End: 2019-02-01
Payer: COMMERCIAL

## 2019-02-01 DIAGNOSIS — C34.11 PANCOAST TUMOR OF RIGHT LUNG (HCC): Primary | ICD-10-CM

## 2019-02-03 ENCOUNTER — HOSPITAL ENCOUNTER (OUTPATIENT)
Dept: MRI IMAGING | Age: 49
Discharge: HOME | End: 2019-02-05
Payer: COMMERCIAL

## 2019-02-03 DIAGNOSIS — R91.8 LUNG MASS: ICD-10-CM

## 2019-02-03 PROCEDURE — A9579 GAD-BASE MR CONTRAST NOS,1ML: HCPCS | Performed by: RADIOLOGY

## 2019-02-03 PROCEDURE — 70553 MRI BRAIN STEM W/O & W/DYE: CPT

## 2019-02-03 PROCEDURE — 6360000004 HC RX CONTRAST MEDICATION: Performed by: RADIOLOGY

## 2019-02-03 RX ADMIN — GADOTERIDOL 20 ML: 279.3 INJECTION, SOLUTION INTRAVENOUS at 13:07

## 2019-02-04 ENCOUNTER — TELEPHONE (OUTPATIENT)
Dept: ONCOLOGY | Age: 49
End: 2019-02-04

## 2019-02-04 ENCOUNTER — HOSPITAL ENCOUNTER (OUTPATIENT)
Dept: INFUSION THERAPY | Age: 49
End: 2019-02-04

## 2019-02-04 DIAGNOSIS — R91.8 LUNG MASS: Primary | ICD-10-CM

## 2019-02-07 ENCOUNTER — TELEPHONE (OUTPATIENT)
Dept: ONCOLOGY | Age: 49
End: 2019-02-07

## 2019-02-07 DIAGNOSIS — R91.8 LUNG MASS: Primary | ICD-10-CM

## 2019-02-10 ENCOUNTER — HOSPITAL ENCOUNTER (OUTPATIENT)
Age: 49
Discharge: HOME | End: 2019-02-10
Payer: COMMERCIAL

## 2019-02-10 DIAGNOSIS — C34.10 PANCOAST TUMOR, UNSPECIFIED LATERALITY (HCC): ICD-10-CM

## 2019-02-10 LAB
ALBUMIN SERPL-MCNC: 4 G/DL (ref 3.5–5.2)
ALP BLD-CCNC: 78 U/L (ref 40–129)
ALT SERPL-CCNC: 17 U/L (ref 0–40)
ANION GAP SERPL CALCULATED.3IONS-SCNC: 11 MMOL/L (ref 7–16)
AST SERPL-CCNC: 16 U/L (ref 0–39)
BASOPHILS ABSOLUTE: 0.05 E9/L (ref 0–0.2)
BASOPHILS RELATIVE PERCENT: 0.5 % (ref 0–2)
BILIRUB SERPL-MCNC: 0.4 MG/DL (ref 0–1.2)
BUN BLDV-MCNC: 9 MG/DL (ref 6–20)
CALCIUM SERPL-MCNC: 9.8 MG/DL (ref 8.6–10.2)
CHLORIDE BLD-SCNC: 104 MMOL/L (ref 98–107)
CO2: 23 MMOL/L (ref 22–29)
CREAT SERPL-MCNC: 1.2 MG/DL (ref 0.7–1.2)
EOSINOPHILS ABSOLUTE: 0.12 E9/L (ref 0.05–0.5)
EOSINOPHILS RELATIVE PERCENT: 1.1 % (ref 0–6)
GFR AFRICAN AMERICAN: >60
GFR NON-AFRICAN AMERICAN: >60 ML/MIN/1.73
GLUCOSE BLD-MCNC: 97 MG/DL (ref 74–99)
HCT VFR BLD CALC: 43.5 % (ref 37–54)
HEMOGLOBIN: 14.4 G/DL (ref 12.5–16.5)
IMMATURE GRANULOCYTES #: 0.04 E9/L
IMMATURE GRANULOCYTES %: 0.4 % (ref 0–5)
LYMPHOCYTES ABSOLUTE: 2.35 E9/L (ref 1.5–4)
LYMPHOCYTES RELATIVE PERCENT: 21.6 % (ref 20–42)
MAGNESIUM: 2.2 MG/DL (ref 1.6–2.6)
MCH RBC QN AUTO: 26.3 PG (ref 26–35)
MCHC RBC AUTO-ENTMCNC: 33.1 % (ref 32–34.5)
MCV RBC AUTO: 79.4 FL (ref 80–99.9)
MONOCYTES ABSOLUTE: 0.84 E9/L (ref 0.1–0.95)
MONOCYTES RELATIVE PERCENT: 7.7 % (ref 2–12)
NEUTROPHILS ABSOLUTE: 7.46 E9/L (ref 1.8–7.3)
NEUTROPHILS RELATIVE PERCENT: 68.7 % (ref 43–80)
PDW BLD-RTO: 13.8 FL (ref 11.5–15)
PLATELET # BLD: 301 E9/L (ref 130–450)
PMV BLD AUTO: 9.2 FL (ref 7–12)
POTASSIUM SERPL-SCNC: 4.7 MMOL/L (ref 3.5–5)
RBC # BLD: 5.48 E12/L (ref 3.8–5.8)
SODIUM BLD-SCNC: 138 MMOL/L (ref 132–146)
TOTAL PROTEIN: 7.7 G/DL (ref 6.4–8.3)
WBC # BLD: 10.9 E9/L (ref 4.5–11.5)

## 2019-02-10 PROCEDURE — 36415 COLL VENOUS BLD VENIPUNCTURE: CPT

## 2019-02-10 PROCEDURE — 83735 ASSAY OF MAGNESIUM: CPT

## 2019-02-10 PROCEDURE — 85025 COMPLETE CBC W/AUTO DIFF WBC: CPT

## 2019-02-10 PROCEDURE — 80053 COMPREHEN METABOLIC PANEL: CPT

## 2019-02-11 ENCOUNTER — HOSPITAL ENCOUNTER (OUTPATIENT)
Dept: INFUSION THERAPY | Age: 49
End: 2019-02-11

## 2019-02-14 ENCOUNTER — TELEPHONE (OUTPATIENT)
Dept: RADIATION ONCOLOGY | Age: 49
End: 2019-02-14

## 2019-02-15 PROCEDURE — 77338 DESIGN MLC DEVICE FOR IMRT: CPT

## 2019-02-15 PROCEDURE — 77300 RADIATION THERAPY DOSE PLAN: CPT

## 2019-02-15 PROCEDURE — 77301 RADIOTHERAPY DOSE PLAN IMRT: CPT

## 2019-02-18 ENCOUNTER — HOSPITAL ENCOUNTER (OUTPATIENT)
Age: 49
Discharge: HOME | End: 2019-02-18
Payer: COMMERCIAL

## 2019-02-18 ENCOUNTER — OFFICE VISIT (OUTPATIENT)
Dept: ONCOLOGY | Age: 49
End: 2019-02-18
Payer: COMMERCIAL

## 2019-02-18 ENCOUNTER — TELEPHONE (OUTPATIENT)
Dept: CASE MANAGEMENT | Age: 49
End: 2019-02-18

## 2019-02-18 ENCOUNTER — HOSPITAL ENCOUNTER (OUTPATIENT)
Dept: INFUSION THERAPY | Age: 49
Discharge: HOME | End: 2019-02-18
Payer: COMMERCIAL

## 2019-02-18 VITALS
DIASTOLIC BLOOD PRESSURE: 82 MMHG | SYSTOLIC BLOOD PRESSURE: 138 MMHG | HEART RATE: 73 BPM | RESPIRATION RATE: 18 BRPM | HEIGHT: 73 IN | TEMPERATURE: 97.9 F | WEIGHT: 209 LBS | BODY MASS INDEX: 27.7 KG/M2

## 2019-02-18 DIAGNOSIS — C34.90 NON-SMALL CELL LUNG CANCER, UNSPECIFIED LATERALITY (HCC): Primary | ICD-10-CM

## 2019-02-18 DIAGNOSIS — C34.10 PANCOAST TUMOR, UNSPECIFIED LATERALITY (HCC): ICD-10-CM

## 2019-02-18 LAB
ALBUMIN SERPL-MCNC: 4.2 G/DL (ref 3.5–5.2)
ALP BLD-CCNC: 89 U/L (ref 40–129)
ALT SERPL-CCNC: 19 U/L (ref 0–40)
ANION GAP SERPL CALCULATED.3IONS-SCNC: 8 MMOL/L (ref 7–16)
AST SERPL-CCNC: 19 U/L (ref 0–39)
BASOPHILS ABSOLUTE: 0.03 E9/L (ref 0–0.2)
BASOPHILS RELATIVE PERCENT: 0.3 % (ref 0–2)
BILIRUB SERPL-MCNC: 0.6 MG/DL (ref 0–1.2)
BUN BLDV-MCNC: 12 MG/DL (ref 6–20)
CALCIUM SERPL-MCNC: 9 MG/DL (ref 8.6–10.2)
CHLORIDE BLD-SCNC: 106 MMOL/L (ref 98–107)
CO2: 26 MMOL/L (ref 22–29)
CREAT SERPL-MCNC: 1.2 MG/DL (ref 0.7–1.2)
EOSINOPHILS ABSOLUTE: 0.16 E9/L (ref 0.05–0.5)
EOSINOPHILS RELATIVE PERCENT: 1.5 % (ref 0–6)
GFR AFRICAN AMERICAN: >60
GFR NON-AFRICAN AMERICAN: >60 ML/MIN/1.73
GLUCOSE BLD-MCNC: 90 MG/DL (ref 74–99)
HCT VFR BLD CALC: 42.1 % (ref 37–54)
HEMOGLOBIN: 14.3 G/DL (ref 12.5–16.5)
IMMATURE GRANULOCYTES #: 0.04 E9/L
IMMATURE GRANULOCYTES %: 0.4 % (ref 0–5)
LYMPHOCYTES ABSOLUTE: 2.23 E9/L (ref 1.5–4)
LYMPHOCYTES RELATIVE PERCENT: 20.5 % (ref 20–42)
MAGNESIUM: 2.2 MG/DL (ref 1.6–2.6)
MCH RBC QN AUTO: 26.7 PG (ref 26–35)
MCHC RBC AUTO-ENTMCNC: 34 % (ref 32–34.5)
MCV RBC AUTO: 78.7 FL (ref 80–99.9)
MONOCYTES ABSOLUTE: 0.86 E9/L (ref 0.1–0.95)
MONOCYTES RELATIVE PERCENT: 7.9 % (ref 2–12)
NEUTROPHILS ABSOLUTE: 7.58 E9/L (ref 1.8–7.3)
NEUTROPHILS RELATIVE PERCENT: 69.4 % (ref 43–80)
PDW BLD-RTO: 13.8 FL (ref 11.5–15)
PLATELET # BLD: 308 E9/L (ref 130–450)
PMV BLD AUTO: 9.1 FL (ref 7–12)
POTASSIUM SERPL-SCNC: 4 MMOL/L (ref 3.5–5)
RBC # BLD: 5.35 E12/L (ref 3.8–5.8)
SODIUM BLD-SCNC: 140 MMOL/L (ref 132–146)
TOTAL PROTEIN: 7.7 G/DL (ref 6.4–8.3)
WBC # BLD: 10.9 E9/L (ref 4.5–11.5)

## 2019-02-18 PROCEDURE — 96415 CHEMO IV INFUSION ADDL HR: CPT

## 2019-02-18 PROCEDURE — 80053 COMPREHEN METABOLIC PANEL: CPT

## 2019-02-18 PROCEDURE — 77386 HC NTSTY MODUL RAD TX DLVR CPLX: CPT

## 2019-02-18 PROCEDURE — 4004F PT TOBACCO SCREEN RCVD TLK: CPT | Performed by: INTERNAL MEDICINE

## 2019-02-18 PROCEDURE — 99214 OFFICE O/P EST MOD 30 MIN: CPT | Performed by: INTERNAL MEDICINE

## 2019-02-18 PROCEDURE — 96413 CHEMO IV INFUSION 1 HR: CPT

## 2019-02-18 PROCEDURE — G8484 FLU IMMUNIZE NO ADMIN: HCPCS | Performed by: INTERNAL MEDICINE

## 2019-02-18 PROCEDURE — 85025 COMPLETE CBC W/AUTO DIFF WBC: CPT

## 2019-02-18 PROCEDURE — G8427 DOCREV CUR MEDS BY ELIG CLIN: HCPCS | Performed by: INTERNAL MEDICINE

## 2019-02-18 PROCEDURE — 96367 TX/PROPH/DG ADDL SEQ IV INF: CPT

## 2019-02-18 PROCEDURE — 2580000003 HC RX 258: Performed by: INTERNAL MEDICINE

## 2019-02-18 PROCEDURE — 96417 CHEMO IV INFUS EACH ADDL SEQ: CPT

## 2019-02-18 PROCEDURE — G8599 NO ASA/ANTIPLAT THER USE RNG: HCPCS | Performed by: INTERNAL MEDICINE

## 2019-02-18 PROCEDURE — 6360000002 HC RX W HCPCS: Performed by: INTERNAL MEDICINE

## 2019-02-18 PROCEDURE — 96375 TX/PRO/DX INJ NEW DRUG ADDON: CPT

## 2019-02-18 PROCEDURE — G8419 CALC BMI OUT NRM PARAM NOF/U: HCPCS | Performed by: INTERNAL MEDICINE

## 2019-02-18 PROCEDURE — 83735 ASSAY OF MAGNESIUM: CPT

## 2019-02-18 RX ORDER — EPINEPHRINE 1 MG/ML
0.3 INJECTION, SOLUTION, CONCENTRATE INTRAVENOUS PRN
Status: CANCELLED | OUTPATIENT
Start: 2019-02-18

## 2019-02-18 RX ORDER — METHYLPREDNISOLONE SODIUM SUCCINATE 125 MG/2ML
125 INJECTION, POWDER, LYOPHILIZED, FOR SOLUTION INTRAMUSCULAR; INTRAVENOUS ONCE
Status: CANCELLED | OUTPATIENT
Start: 2019-02-25 | End: 2019-02-25

## 2019-02-18 RX ORDER — SODIUM CHLORIDE 9 MG/ML
INJECTION, SOLUTION INTRAVENOUS CONTINUOUS
Status: CANCELLED | OUTPATIENT
Start: 2019-02-25

## 2019-02-18 RX ORDER — EPINEPHRINE 1 MG/ML
0.3 INJECTION, SOLUTION, CONCENTRATE INTRAVENOUS PRN
Status: CANCELLED | OUTPATIENT
Start: 2019-02-25

## 2019-02-18 RX ORDER — METHYLPREDNISOLONE SODIUM SUCCINATE 125 MG/2ML
125 INJECTION, POWDER, LYOPHILIZED, FOR SOLUTION INTRAMUSCULAR; INTRAVENOUS ONCE
Status: CANCELLED | OUTPATIENT
Start: 2019-02-21 | End: 2019-02-21

## 2019-02-18 RX ORDER — 0.9 % SODIUM CHLORIDE 0.9 %
10 VIAL (ML) INJECTION ONCE
Status: CANCELLED | OUTPATIENT
Start: 2019-02-18 | End: 2019-02-18

## 2019-02-18 RX ORDER — HEPARIN SODIUM (PORCINE) LOCK FLUSH IV SOLN 100 UNIT/ML 100 UNIT/ML
500 SOLUTION INTRAVENOUS PRN
Status: CANCELLED | OUTPATIENT
Start: 2019-02-22

## 2019-02-18 RX ORDER — 0.9 % SODIUM CHLORIDE 0.9 %
10 VIAL (ML) INJECTION ONCE
Status: CANCELLED | OUTPATIENT
Start: 2019-02-20 | End: 2019-02-20

## 2019-02-18 RX ORDER — DEXAMETHASONE SODIUM PHOSPHATE 10 MG/ML
10 INJECTION, SOLUTION INTRAMUSCULAR; INTRAVENOUS ONCE
Status: CANCELLED | OUTPATIENT
Start: 2019-02-21

## 2019-02-18 RX ORDER — SODIUM CHLORIDE 9 MG/ML
INJECTION, SOLUTION INTRAVENOUS ONCE
Status: CANCELLED | OUTPATIENT
Start: 2019-02-22 | End: 2019-02-22

## 2019-02-18 RX ORDER — SODIUM CHLORIDE 9 MG/ML
INJECTION, SOLUTION INTRAVENOUS ONCE
Status: CANCELLED | OUTPATIENT
Start: 2019-02-21 | End: 2019-02-21

## 2019-02-18 RX ORDER — 0.9 % SODIUM CHLORIDE 0.9 %
10 VIAL (ML) INJECTION ONCE
Status: CANCELLED | OUTPATIENT
Start: 2019-02-22 | End: 2019-02-22

## 2019-02-18 RX ORDER — METHYLPREDNISOLONE SODIUM SUCCINATE 125 MG/2ML
125 INJECTION, POWDER, LYOPHILIZED, FOR SOLUTION INTRAMUSCULAR; INTRAVENOUS ONCE
Status: CANCELLED | OUTPATIENT
Start: 2019-02-18 | End: 2019-02-18

## 2019-02-18 RX ORDER — EPINEPHRINE 1 MG/ML
0.3 INJECTION, SOLUTION, CONCENTRATE INTRAVENOUS PRN
Status: CANCELLED | OUTPATIENT
Start: 2019-02-20

## 2019-02-18 RX ORDER — HEPARIN SODIUM (PORCINE) LOCK FLUSH IV SOLN 100 UNIT/ML 100 UNIT/ML
500 SOLUTION INTRAVENOUS PRN
Status: CANCELLED | OUTPATIENT
Start: 2019-02-19

## 2019-02-18 RX ORDER — SODIUM CHLORIDE 9 MG/ML
INJECTION, SOLUTION INTRAVENOUS CONTINUOUS
Status: CANCELLED | OUTPATIENT
Start: 2019-02-21

## 2019-02-18 RX ORDER — 0.9 % SODIUM CHLORIDE 0.9 %
10 VIAL (ML) INJECTION ONCE
Status: CANCELLED | OUTPATIENT
Start: 2019-02-19 | End: 2019-02-19

## 2019-02-18 RX ORDER — SODIUM CHLORIDE 9 MG/ML
INJECTION, SOLUTION INTRAVENOUS CONTINUOUS
Status: CANCELLED | OUTPATIENT
Start: 2019-02-22

## 2019-02-18 RX ORDER — SODIUM CHLORIDE 9 MG/ML
INJECTION, SOLUTION INTRAVENOUS CONTINUOUS
Status: CANCELLED | OUTPATIENT
Start: 2019-02-18

## 2019-02-18 RX ORDER — SODIUM CHLORIDE 9 MG/ML
INJECTION, SOLUTION INTRAVENOUS ONCE
Status: CANCELLED | OUTPATIENT
Start: 2019-02-20 | End: 2019-02-20

## 2019-02-18 RX ORDER — METHYLPREDNISOLONE SODIUM SUCCINATE 125 MG/2ML
125 INJECTION, POWDER, LYOPHILIZED, FOR SOLUTION INTRAMUSCULAR; INTRAVENOUS ONCE
Status: CANCELLED | OUTPATIENT
Start: 2019-02-22 | End: 2019-02-22

## 2019-02-18 RX ORDER — DIPHENHYDRAMINE HYDROCHLORIDE 50 MG/ML
50 INJECTION INTRAMUSCULAR; INTRAVENOUS ONCE
Status: CANCELLED | OUTPATIENT
Start: 2019-02-20 | End: 2019-02-20

## 2019-02-18 RX ORDER — EPINEPHRINE 1 MG/ML
0.3 INJECTION, SOLUTION, CONCENTRATE INTRAVENOUS PRN
Status: CANCELLED | OUTPATIENT
Start: 2019-02-19

## 2019-02-18 RX ORDER — DIPHENHYDRAMINE HYDROCHLORIDE 50 MG/ML
50 INJECTION INTRAMUSCULAR; INTRAVENOUS ONCE
Status: CANCELLED | OUTPATIENT
Start: 2019-02-18 | End: 2019-02-18

## 2019-02-18 RX ORDER — SODIUM CHLORIDE 0.9 % (FLUSH) 0.9 %
10 SYRINGE (ML) INJECTION PRN
Status: CANCELLED | OUTPATIENT
Start: 2019-02-21

## 2019-02-18 RX ORDER — DIPHENHYDRAMINE HYDROCHLORIDE 50 MG/ML
50 INJECTION INTRAMUSCULAR; INTRAVENOUS ONCE
Status: CANCELLED | OUTPATIENT
Start: 2019-02-22 | End: 2019-02-22

## 2019-02-18 RX ORDER — DEXAMETHASONE SODIUM PHOSPHATE 10 MG/ML
10 INJECTION, SOLUTION INTRAMUSCULAR; INTRAVENOUS ONCE
Status: CANCELLED | OUTPATIENT
Start: 2019-02-19

## 2019-02-18 RX ORDER — SODIUM CHLORIDE 0.9 % (FLUSH) 0.9 %
10 SYRINGE (ML) INJECTION PRN
Status: DISCONTINUED | OUTPATIENT
Start: 2019-02-18 | End: 2019-02-19 | Stop reason: HOSPADM

## 2019-02-18 RX ORDER — PALONOSETRON 0.05 MG/ML
0.25 INJECTION, SOLUTION INTRAVENOUS ONCE
Status: CANCELLED | OUTPATIENT
Start: 2019-02-25

## 2019-02-18 RX ORDER — SODIUM CHLORIDE 0.9 % (FLUSH) 0.9 %
10 SYRINGE (ML) INJECTION PRN
Status: CANCELLED | OUTPATIENT
Start: 2019-02-22

## 2019-02-18 RX ORDER — SODIUM CHLORIDE 0.9 % (FLUSH) 0.9 %
5 SYRINGE (ML) INJECTION PRN
Status: CANCELLED | OUTPATIENT
Start: 2019-02-19

## 2019-02-18 RX ORDER — SODIUM CHLORIDE 0.9 % (FLUSH) 0.9 %
5 SYRINGE (ML) INJECTION PRN
Status: CANCELLED | OUTPATIENT
Start: 2019-02-25

## 2019-02-18 RX ORDER — PALONOSETRON HYDROCHLORIDE 0.05 MG/ML
0.25 INJECTION, SOLUTION INTRAVENOUS ONCE
Status: COMPLETED | OUTPATIENT
Start: 2019-02-18 | End: 2019-02-18

## 2019-02-18 RX ORDER — DIPHENHYDRAMINE HYDROCHLORIDE 50 MG/ML
50 INJECTION INTRAMUSCULAR; INTRAVENOUS ONCE
Status: CANCELLED | OUTPATIENT
Start: 2019-02-25 | End: 2019-02-25

## 2019-02-18 RX ORDER — SODIUM CHLORIDE 0.9 % (FLUSH) 0.9 %
5 SYRINGE (ML) INJECTION PRN
Status: CANCELLED | OUTPATIENT
Start: 2019-02-18

## 2019-02-18 RX ORDER — SODIUM CHLORIDE 9 MG/ML
INJECTION, SOLUTION INTRAVENOUS ONCE
Status: CANCELLED | OUTPATIENT
Start: 2019-02-19 | End: 2019-02-19

## 2019-02-18 RX ORDER — DEXAMETHASONE SODIUM PHOSPHATE 10 MG/ML
10 INJECTION, SOLUTION INTRAMUSCULAR; INTRAVENOUS ONCE
Status: CANCELLED | OUTPATIENT
Start: 2019-02-18

## 2019-02-18 RX ORDER — METHYLPREDNISOLONE SODIUM SUCCINATE 125 MG/2ML
125 INJECTION, POWDER, LYOPHILIZED, FOR SOLUTION INTRAMUSCULAR; INTRAVENOUS ONCE
Status: CANCELLED | OUTPATIENT
Start: 2019-02-20 | End: 2019-02-20

## 2019-02-18 RX ORDER — DEXAMETHASONE SODIUM PHOSPHATE 10 MG/ML
10 INJECTION, SOLUTION INTRAMUSCULAR; INTRAVENOUS ONCE
Status: CANCELLED | OUTPATIENT
Start: 2019-02-22

## 2019-02-18 RX ORDER — EPINEPHRINE 1 MG/ML
0.3 INJECTION, SOLUTION, CONCENTRATE INTRAVENOUS PRN
Status: CANCELLED | OUTPATIENT
Start: 2019-02-21

## 2019-02-18 RX ORDER — HEPARIN SODIUM (PORCINE) LOCK FLUSH IV SOLN 100 UNIT/ML 100 UNIT/ML
500 SOLUTION INTRAVENOUS PRN
Status: CANCELLED | OUTPATIENT
Start: 2019-02-25

## 2019-02-18 RX ORDER — SODIUM CHLORIDE 9 MG/ML
INJECTION, SOLUTION INTRAVENOUS ONCE
Status: CANCELLED | OUTPATIENT
Start: 2019-02-25 | End: 2019-02-25

## 2019-02-18 RX ORDER — DIPHENHYDRAMINE HYDROCHLORIDE 50 MG/ML
50 INJECTION INTRAMUSCULAR; INTRAVENOUS ONCE
Status: CANCELLED | OUTPATIENT
Start: 2019-02-21 | End: 2019-02-21

## 2019-02-18 RX ORDER — EPINEPHRINE 1 MG/ML
0.3 INJECTION, SOLUTION, CONCENTRATE INTRAVENOUS PRN
Status: CANCELLED | OUTPATIENT
Start: 2019-02-22

## 2019-02-18 RX ORDER — SODIUM CHLORIDE 0.9 % (FLUSH) 0.9 %
10 SYRINGE (ML) INJECTION PRN
Status: CANCELLED | OUTPATIENT
Start: 2019-02-20

## 2019-02-18 RX ORDER — HEPARIN SODIUM (PORCINE) LOCK FLUSH IV SOLN 100 UNIT/ML 100 UNIT/ML
500 SOLUTION INTRAVENOUS PRN
Status: CANCELLED | OUTPATIENT
Start: 2019-02-20

## 2019-02-18 RX ORDER — SODIUM CHLORIDE 0.9 % (FLUSH) 0.9 %
5 SYRINGE (ML) INJECTION PRN
Status: CANCELLED | OUTPATIENT
Start: 2019-02-21

## 2019-02-18 RX ORDER — HEPARIN SODIUM (PORCINE) LOCK FLUSH IV SOLN 100 UNIT/ML 100 UNIT/ML
500 SOLUTION INTRAVENOUS PRN
Status: CANCELLED | OUTPATIENT
Start: 2019-02-18

## 2019-02-18 RX ORDER — DEXAMETHASONE SODIUM PHOSPHATE 10 MG/ML
10 INJECTION, SOLUTION INTRAMUSCULAR; INTRAVENOUS ONCE
Status: CANCELLED | OUTPATIENT
Start: 2019-02-25

## 2019-02-18 RX ORDER — SODIUM CHLORIDE 9 MG/ML
INJECTION, SOLUTION INTRAVENOUS ONCE
Status: CANCELLED | OUTPATIENT
Start: 2019-02-18 | End: 2019-02-18

## 2019-02-18 RX ORDER — DIPHENHYDRAMINE HYDROCHLORIDE 50 MG/ML
50 INJECTION INTRAMUSCULAR; INTRAVENOUS ONCE
Status: CANCELLED | OUTPATIENT
Start: 2019-02-19 | End: 2019-02-19

## 2019-02-18 RX ORDER — SODIUM CHLORIDE 0.9 % (FLUSH) 0.9 %
10 SYRINGE (ML) INJECTION PRN
Status: CANCELLED | OUTPATIENT
Start: 2019-02-18

## 2019-02-18 RX ORDER — DEXAMETHASONE SODIUM PHOSPHATE 10 MG/ML
10 INJECTION, SOLUTION INTRAMUSCULAR; INTRAVENOUS ONCE
Status: CANCELLED | OUTPATIENT
Start: 2019-02-20

## 2019-02-18 RX ORDER — SODIUM CHLORIDE 0.9 % (FLUSH) 0.9 %
5 SYRINGE (ML) INJECTION PRN
Status: CANCELLED | OUTPATIENT
Start: 2019-02-20

## 2019-02-18 RX ORDER — SODIUM CHLORIDE 9 MG/ML
250 INJECTION, SOLUTION INTRAVENOUS ONCE
Status: COMPLETED | OUTPATIENT
Start: 2019-02-18 | End: 2019-02-18

## 2019-02-18 RX ORDER — HEPARIN SODIUM (PORCINE) LOCK FLUSH IV SOLN 100 UNIT/ML 100 UNIT/ML
500 SOLUTION INTRAVENOUS PRN
Status: DISCONTINUED | OUTPATIENT
Start: 2019-02-18 | End: 2019-02-19 | Stop reason: HOSPADM

## 2019-02-18 RX ORDER — PALONOSETRON 0.05 MG/ML
0.25 INJECTION, SOLUTION INTRAVENOUS ONCE
Status: CANCELLED | OUTPATIENT
Start: 2019-02-18

## 2019-02-18 RX ORDER — SODIUM CHLORIDE 9 MG/ML
INJECTION, SOLUTION INTRAVENOUS CONTINUOUS
Status: CANCELLED | OUTPATIENT
Start: 2019-02-20

## 2019-02-18 RX ORDER — SODIUM CHLORIDE 9 MG/ML
INJECTION, SOLUTION INTRAVENOUS CONTINUOUS
Status: CANCELLED | OUTPATIENT
Start: 2019-02-19

## 2019-02-18 RX ORDER — 0.9 % SODIUM CHLORIDE 0.9 %
10 VIAL (ML) INJECTION ONCE
Status: CANCELLED | OUTPATIENT
Start: 2019-02-21 | End: 2019-02-21

## 2019-02-18 RX ORDER — SODIUM CHLORIDE 0.9 % (FLUSH) 0.9 %
10 SYRINGE (ML) INJECTION PRN
Status: CANCELLED | OUTPATIENT
Start: 2019-02-19

## 2019-02-18 RX ORDER — SODIUM CHLORIDE 0.9 % (FLUSH) 0.9 %
10 SYRINGE (ML) INJECTION PRN
Status: CANCELLED | OUTPATIENT
Start: 2019-02-25

## 2019-02-18 RX ORDER — METHYLPREDNISOLONE SODIUM SUCCINATE 125 MG/2ML
125 INJECTION, POWDER, LYOPHILIZED, FOR SOLUTION INTRAMUSCULAR; INTRAVENOUS ONCE
Status: CANCELLED | OUTPATIENT
Start: 2019-02-19 | End: 2019-02-19

## 2019-02-18 RX ORDER — 0.9 % SODIUM CHLORIDE 0.9 %
10 VIAL (ML) INJECTION ONCE
Status: CANCELLED | OUTPATIENT
Start: 2019-02-25 | End: 2019-02-25

## 2019-02-18 RX ORDER — SODIUM CHLORIDE 0.9 % (FLUSH) 0.9 %
5 SYRINGE (ML) INJECTION PRN
Status: CANCELLED | OUTPATIENT
Start: 2019-02-22

## 2019-02-18 RX ORDER — DEXAMETHASONE SODIUM PHOSPHATE 10 MG/ML
10 INJECTION INTRAMUSCULAR; INTRAVENOUS ONCE
Status: COMPLETED | OUTPATIENT
Start: 2019-02-18 | End: 2019-02-18

## 2019-02-18 RX ORDER — HEPARIN SODIUM (PORCINE) LOCK FLUSH IV SOLN 100 UNIT/ML 100 UNIT/ML
500 SOLUTION INTRAVENOUS PRN
Status: CANCELLED | OUTPATIENT
Start: 2019-02-21

## 2019-02-18 RX ADMIN — PALONOSETRON 0.25 MG: 0.25 INJECTION, SOLUTION INTRAVENOUS at 09:08

## 2019-02-18 RX ADMIN — POTASSIUM CHLORIDE: 2 INJECTION, SOLUTION, CONCENTRATE INTRAVENOUS at 09:56

## 2019-02-18 RX ADMIN — SODIUM CHLORIDE 150 MG: 9 INJECTION, SOLUTION INTRAVENOUS at 09:20

## 2019-02-18 RX ADMIN — Medication 500 UNITS: at 13:32

## 2019-02-18 RX ADMIN — ETOPOSIDE 120 MG: 20 INJECTION, SOLUTION, CONCENTRATE INTRAVENOUS at 12:18

## 2019-02-18 RX ADMIN — Medication 10 ML: at 13:32

## 2019-02-18 RX ADMIN — SODIUM CHLORIDE 250 ML: 9 INJECTION, SOLUTION INTRAVENOUS at 09:08

## 2019-02-18 RX ADMIN — DEXAMETHASONE SODIUM PHOSPHATE 10 MG: 10 INJECTION INTRAMUSCULAR; INTRAVENOUS at 09:08

## 2019-02-18 NOTE — PROGRESS NOTES
Pt presents to clinic for anti cancer treatment. Pt tolerated procedure well without complications. Additional education was done bedside concerning what to do after chemotherapy, and additional information on drugs received was given via [employer] print outs. Pt verablized agreement on what to do after chemotherapy and denies any further questions. Pt discharged ambulatory in stable condition.

## 2019-02-19 ENCOUNTER — HOSPITAL ENCOUNTER (OUTPATIENT)
Dept: INFUSION THERAPY | Age: 49
Discharge: HOME | End: 2019-02-19
Payer: COMMERCIAL

## 2019-02-19 VITALS
HEART RATE: 65 BPM | DIASTOLIC BLOOD PRESSURE: 70 MMHG | SYSTOLIC BLOOD PRESSURE: 128 MMHG | RESPIRATION RATE: 20 BRPM | TEMPERATURE: 98.2 F

## 2019-02-19 DIAGNOSIS — C34.10 PANCOAST TUMOR, UNSPECIFIED LATERALITY (HCC): ICD-10-CM

## 2019-02-19 PROCEDURE — 6360000002 HC RX W HCPCS: Performed by: INTERNAL MEDICINE

## 2019-02-19 PROCEDURE — 2580000003 HC RX 258: Performed by: INTERNAL MEDICINE

## 2019-02-19 PROCEDURE — 96413 CHEMO IV INFUSION 1 HR: CPT

## 2019-02-19 PROCEDURE — 77386 HC NTSTY MODUL RAD TX DLVR CPLX: CPT

## 2019-02-19 PROCEDURE — 96375 TX/PRO/DX INJ NEW DRUG ADDON: CPT

## 2019-02-19 RX ORDER — HEPARIN SODIUM (PORCINE) LOCK FLUSH IV SOLN 100 UNIT/ML 100 UNIT/ML
500 SOLUTION INTRAVENOUS PRN
Status: DISCONTINUED | OUTPATIENT
Start: 2019-02-19 | End: 2019-02-20 | Stop reason: HOSPADM

## 2019-02-19 RX ORDER — SODIUM CHLORIDE 0.9 % (FLUSH) 0.9 %
10 SYRINGE (ML) INJECTION PRN
Status: DISCONTINUED | OUTPATIENT
Start: 2019-02-19 | End: 2019-02-20 | Stop reason: HOSPADM

## 2019-02-19 RX ORDER — SODIUM CHLORIDE 9 MG/ML
250 INJECTION, SOLUTION INTRAVENOUS ONCE
Status: COMPLETED | OUTPATIENT
Start: 2019-02-19 | End: 2019-02-19

## 2019-02-19 RX ORDER — DEXAMETHASONE SODIUM PHOSPHATE 10 MG/ML
10 INJECTION INTRAMUSCULAR; INTRAVENOUS ONCE
Status: COMPLETED | OUTPATIENT
Start: 2019-02-19 | End: 2019-02-19

## 2019-02-19 RX ADMIN — SODIUM CHLORIDE, PRESERVATIVE FREE 500 UNITS: 5 INJECTION INTRAVENOUS at 14:17

## 2019-02-19 RX ADMIN — SODIUM CHLORIDE 250 ML: 9 INJECTION, SOLUTION INTRAVENOUS at 12:53

## 2019-02-19 RX ADMIN — Medication 10 ML: at 14:17

## 2019-02-19 RX ADMIN — ETOPOSIDE 120 MG: 20 INJECTION, SOLUTION, CONCENTRATE INTRAVENOUS at 12:59

## 2019-02-19 RX ADMIN — DEXAMETHASONE SODIUM PHOSPHATE 10 MG: 10 INJECTION INTRAMUSCULAR; INTRAVENOUS at 12:53

## 2019-02-20 ENCOUNTER — HOSPITAL ENCOUNTER (OUTPATIENT)
Dept: INFUSION THERAPY | Age: 49
Discharge: HOME | End: 2019-02-20
Payer: COMMERCIAL

## 2019-02-20 ENCOUNTER — HOSPITAL ENCOUNTER (OUTPATIENT)
Dept: RADIATION ONCOLOGY | Age: 49
Discharge: HOME | End: 2019-02-20
Payer: COMMERCIAL

## 2019-02-20 ENCOUNTER — TELEPHONE (OUTPATIENT)
Dept: PALLATIVE CARE | Age: 49
End: 2019-02-20

## 2019-02-20 VITALS
DIASTOLIC BLOOD PRESSURE: 60 MMHG | WEIGHT: 215.5 LBS | SYSTOLIC BLOOD PRESSURE: 114 MMHG | BODY MASS INDEX: 28.43 KG/M2 | TEMPERATURE: 96 F | HEART RATE: 67 BPM | OXYGEN SATURATION: 96 %

## 2019-02-20 VITALS
DIASTOLIC BLOOD PRESSURE: 78 MMHG | RESPIRATION RATE: 18 BRPM | HEART RATE: 53 BPM | SYSTOLIC BLOOD PRESSURE: 138 MMHG | TEMPERATURE: 98.7 F

## 2019-02-20 DIAGNOSIS — C34.10 PANCOAST TUMOR, UNSPECIFIED LATERALITY (HCC): ICD-10-CM

## 2019-02-20 DIAGNOSIS — C34.11 PANCOAST TUMOR OF RIGHT LUNG (HCC): Primary | ICD-10-CM

## 2019-02-20 PROCEDURE — 96413 CHEMO IV INFUSION 1 HR: CPT

## 2019-02-20 PROCEDURE — 2580000003 HC RX 258: Performed by: INTERNAL MEDICINE

## 2019-02-20 PROCEDURE — 77386 HC NTSTY MODUL RAD TX DLVR CPLX: CPT

## 2019-02-20 PROCEDURE — 96375 TX/PRO/DX INJ NEW DRUG ADDON: CPT

## 2019-02-20 PROCEDURE — 6360000002 HC RX W HCPCS: Performed by: INTERNAL MEDICINE

## 2019-02-20 PROCEDURE — 99999 PR OFFICE/OUTPT VISIT,PROCEDURE ONLY: CPT | Performed by: RADIOLOGY

## 2019-02-20 RX ORDER — SODIUM CHLORIDE 0.9 % (FLUSH) 0.9 %
10 SYRINGE (ML) INJECTION PRN
Status: DISCONTINUED | OUTPATIENT
Start: 2019-02-20 | End: 2019-02-21 | Stop reason: HOSPADM

## 2019-02-20 RX ORDER — DEXAMETHASONE SODIUM PHOSPHATE 10 MG/ML
10 INJECTION INTRAMUSCULAR; INTRAVENOUS ONCE
Status: COMPLETED | OUTPATIENT
Start: 2019-02-20 | End: 2019-02-20

## 2019-02-20 RX ORDER — OXYCODONE HYDROCHLORIDE AND ACETAMINOPHEN 5; 325 MG/1; MG/1
1 TABLET ORAL 3 TIMES DAILY PRN
COMMUNITY

## 2019-02-20 RX ORDER — SODIUM CHLORIDE 9 MG/ML
250 INJECTION, SOLUTION INTRAVENOUS ONCE
Status: DISCONTINUED | OUTPATIENT
Start: 2019-02-20 | End: 2019-02-21 | Stop reason: HOSPADM

## 2019-02-20 RX ORDER — HEPARIN SODIUM (PORCINE) LOCK FLUSH IV SOLN 100 UNIT/ML 100 UNIT/ML
500 SOLUTION INTRAVENOUS PRN
Status: DISCONTINUED | OUTPATIENT
Start: 2019-02-20 | End: 2019-02-21 | Stop reason: HOSPADM

## 2019-02-20 RX ADMIN — ETOPOSIDE 120 MG: 20 INJECTION, SOLUTION, CONCENTRATE INTRAVENOUS at 14:20

## 2019-02-20 RX ADMIN — SODIUM CHLORIDE 250 ML: 9 INJECTION, SOLUTION INTRAVENOUS at 14:01

## 2019-02-20 RX ADMIN — DEXAMETHASONE SODIUM PHOSPHATE 10 MG: 10 INJECTION INTRAMUSCULAR; INTRAVENOUS at 14:01

## 2019-02-20 RX ADMIN — SODIUM CHLORIDE, PRESERVATIVE FREE 500 UNITS: 5 INJECTION INTRAVENOUS at 15:37

## 2019-02-20 RX ADMIN — Medication 10 ML: at 14:02

## 2019-02-20 RX ADMIN — Medication 10 ML: at 15:37

## 2019-02-20 RX ADMIN — Medication 10 ML: at 14:01

## 2019-02-21 ENCOUNTER — HOSPITAL ENCOUNTER (OUTPATIENT)
Dept: INFUSION THERAPY | Age: 49
Discharge: HOME | End: 2019-02-21
Payer: COMMERCIAL

## 2019-02-21 VITALS
HEART RATE: 61 BPM | TEMPERATURE: 97.7 F | SYSTOLIC BLOOD PRESSURE: 148 MMHG | DIASTOLIC BLOOD PRESSURE: 75 MMHG | RESPIRATION RATE: 20 BRPM

## 2019-02-21 DIAGNOSIS — C34.10 PANCOAST TUMOR, UNSPECIFIED LATERALITY (HCC): ICD-10-CM

## 2019-02-21 PROCEDURE — 77386 HC NTSTY MODUL RAD TX DLVR CPLX: CPT

## 2019-02-21 PROCEDURE — 96375 TX/PRO/DX INJ NEW DRUG ADDON: CPT

## 2019-02-21 PROCEDURE — 2580000003 HC RX 258: Performed by: INTERNAL MEDICINE

## 2019-02-21 PROCEDURE — 6360000002 HC RX W HCPCS: Performed by: INTERNAL MEDICINE

## 2019-02-21 PROCEDURE — 96413 CHEMO IV INFUSION 1 HR: CPT

## 2019-02-21 RX ORDER — SODIUM CHLORIDE 9 MG/ML
250 INJECTION, SOLUTION INTRAVENOUS ONCE
Status: COMPLETED | OUTPATIENT
Start: 2019-02-21 | End: 2019-02-21

## 2019-02-21 RX ORDER — HEPARIN SODIUM (PORCINE) LOCK FLUSH IV SOLN 100 UNIT/ML 100 UNIT/ML
500 SOLUTION INTRAVENOUS PRN
Status: DISCONTINUED | OUTPATIENT
Start: 2019-02-21 | End: 2019-02-22 | Stop reason: HOSPADM

## 2019-02-21 RX ORDER — DEXAMETHASONE SODIUM PHOSPHATE 10 MG/ML
10 INJECTION INTRAMUSCULAR; INTRAVENOUS ONCE
Status: COMPLETED | OUTPATIENT
Start: 2019-02-21 | End: 2019-02-21

## 2019-02-21 RX ORDER — SODIUM CHLORIDE 0.9 % (FLUSH) 0.9 %
10 SYRINGE (ML) INJECTION PRN
Status: DISCONTINUED | OUTPATIENT
Start: 2019-02-21 | End: 2019-02-22 | Stop reason: HOSPADM

## 2019-02-21 RX ADMIN — SODIUM CHLORIDE 250 ML: 9 INJECTION, SOLUTION INTRAVENOUS at 13:24

## 2019-02-21 RX ADMIN — Medication 10 ML: at 13:25

## 2019-02-21 RX ADMIN — DEXAMETHASONE SODIUM PHOSPHATE 10 MG: 10 INJECTION INTRAMUSCULAR; INTRAVENOUS at 13:25

## 2019-02-21 RX ADMIN — Medication 10 ML: at 14:54

## 2019-02-21 RX ADMIN — ETOPOSIDE 120 MG: 20 INJECTION, SOLUTION, CONCENTRATE INTRAVENOUS at 13:42

## 2019-02-21 RX ADMIN — SODIUM CHLORIDE, PRESERVATIVE FREE 500 UNITS: 5 INJECTION INTRAVENOUS at 14:55

## 2019-02-22 ENCOUNTER — HOSPITAL ENCOUNTER (OUTPATIENT)
Age: 49
Discharge: HOME | End: 2019-02-22
Payer: COMMERCIAL

## 2019-02-22 ENCOUNTER — HOSPITAL ENCOUNTER (OUTPATIENT)
Dept: INFUSION THERAPY | Age: 49
Discharge: HOME | End: 2019-02-22
Payer: COMMERCIAL

## 2019-02-22 VITALS
SYSTOLIC BLOOD PRESSURE: 127 MMHG | RESPIRATION RATE: 20 BRPM | HEART RATE: 61 BPM | TEMPERATURE: 98.5 F | DIASTOLIC BLOOD PRESSURE: 76 MMHG

## 2019-02-22 DIAGNOSIS — C34.10 PANCOAST TUMOR, UNSPECIFIED LATERALITY (HCC): ICD-10-CM

## 2019-02-22 PROCEDURE — 96413 CHEMO IV INFUSION 1 HR: CPT

## 2019-02-22 PROCEDURE — 77336 RADIATION PHYSICS CONSULT: CPT

## 2019-02-22 PROCEDURE — 6360000002 HC RX W HCPCS: Performed by: INTERNAL MEDICINE

## 2019-02-22 PROCEDURE — 77386 HC NTSTY MODUL RAD TX DLVR CPLX: CPT

## 2019-02-22 PROCEDURE — 2580000003 HC RX 258: Performed by: INTERNAL MEDICINE

## 2019-02-22 PROCEDURE — 96375 TX/PRO/DX INJ NEW DRUG ADDON: CPT

## 2019-02-22 RX ORDER — SODIUM CHLORIDE 0.9 % (FLUSH) 0.9 %
10 SYRINGE (ML) INJECTION PRN
Status: DISCONTINUED | OUTPATIENT
Start: 2019-02-22 | End: 2019-02-23 | Stop reason: HOSPADM

## 2019-02-22 RX ORDER — DEXAMETHASONE SODIUM PHOSPHATE 10 MG/ML
10 INJECTION INTRAMUSCULAR; INTRAVENOUS ONCE
Status: COMPLETED | OUTPATIENT
Start: 2019-02-22 | End: 2019-02-22

## 2019-02-22 RX ORDER — SODIUM CHLORIDE 9 MG/ML
250 INJECTION, SOLUTION INTRAVENOUS ONCE
Status: DISCONTINUED | OUTPATIENT
Start: 2019-02-22 | End: 2019-02-23 | Stop reason: HOSPADM

## 2019-02-22 RX ORDER — HEPARIN SODIUM (PORCINE) LOCK FLUSH IV SOLN 100 UNIT/ML 100 UNIT/ML
500 SOLUTION INTRAVENOUS PRN
Status: DISCONTINUED | OUTPATIENT
Start: 2019-02-22 | End: 2019-02-23 | Stop reason: HOSPADM

## 2019-02-22 RX ADMIN — SODIUM CHLORIDE, PRESERVATIVE FREE 500 UNITS: 5 INJECTION INTRAVENOUS at 15:19

## 2019-02-22 RX ADMIN — Medication 10 ML: at 13:29

## 2019-02-22 RX ADMIN — SODIUM CHLORIDE 250 ML: 9 INJECTION, SOLUTION INTRAVENOUS at 13:36

## 2019-02-22 RX ADMIN — DEXAMETHASONE SODIUM PHOSPHATE 10 MG: 10 INJECTION INTRAMUSCULAR; INTRAVENOUS at 13:36

## 2019-02-22 RX ADMIN — ETOPOSIDE 120 MG: 20 INJECTION, SOLUTION, CONCENTRATE INTRAVENOUS at 14:05

## 2019-02-22 RX ADMIN — Medication 10 ML: at 15:19

## 2019-02-22 RX ADMIN — Medication 10 ML: at 13:36

## 2019-02-25 ENCOUNTER — OFFICE VISIT (OUTPATIENT)
Dept: ONCOLOGY | Age: 49
End: 2019-02-25
Payer: COMMERCIAL

## 2019-02-25 ENCOUNTER — HOSPITAL ENCOUNTER (OUTPATIENT)
Age: 49
Discharge: HOME | End: 2019-02-25
Payer: COMMERCIAL

## 2019-02-25 ENCOUNTER — HOSPITAL ENCOUNTER (OUTPATIENT)
Dept: INFUSION THERAPY | Age: 49
Discharge: HOME | End: 2019-02-25
Payer: COMMERCIAL

## 2019-02-25 VITALS
DIASTOLIC BLOOD PRESSURE: 83 MMHG | SYSTOLIC BLOOD PRESSURE: 131 MMHG | RESPIRATION RATE: 20 BRPM | TEMPERATURE: 97.1 F | WEIGHT: 204.7 LBS | HEIGHT: 73 IN | BODY MASS INDEX: 27.13 KG/M2 | HEART RATE: 97 BPM

## 2019-02-25 VITALS — SYSTOLIC BLOOD PRESSURE: 129 MMHG | DIASTOLIC BLOOD PRESSURE: 74 MMHG | RESPIRATION RATE: 18 BRPM | HEART RATE: 61 BPM

## 2019-02-25 DIAGNOSIS — Z09 FOLLOW UP: Primary | ICD-10-CM

## 2019-02-25 DIAGNOSIS — C34.11 PANCOAST TUMOR OF RIGHT LUNG (HCC): ICD-10-CM

## 2019-02-25 DIAGNOSIS — C34.10 PANCOAST TUMOR, UNSPECIFIED LATERALITY (HCC): ICD-10-CM

## 2019-02-25 DIAGNOSIS — C34.11 PANCOAST TUMOR OF RIGHT LUNG (HCC): Primary | ICD-10-CM

## 2019-02-25 LAB
ALBUMIN SERPL-MCNC: 4 G/DL (ref 3.5–5.2)
ALP BLD-CCNC: 84 U/L (ref 40–129)
ALT SERPL-CCNC: 41 U/L (ref 0–40)
ANION GAP SERPL CALCULATED.3IONS-SCNC: 11 MMOL/L (ref 7–16)
AST SERPL-CCNC: 22 U/L (ref 0–39)
BASOPHILS ABSOLUTE: 0.01 E9/L (ref 0–0.2)
BASOPHILS RELATIVE PERCENT: 0.1 % (ref 0–2)
BILIRUB SERPL-MCNC: 1.1 MG/DL (ref 0–1.2)
BUN BLDV-MCNC: 23 MG/DL (ref 6–20)
CALCIUM SERPL-MCNC: 9.4 MG/DL (ref 8.6–10.2)
CHLORIDE BLD-SCNC: 97 MMOL/L (ref 98–107)
CO2: 26 MMOL/L (ref 22–29)
CREAT SERPL-MCNC: 1.1 MG/DL (ref 0.7–1.2)
EOSINOPHILS ABSOLUTE: 0.02 E9/L (ref 0.05–0.5)
EOSINOPHILS RELATIVE PERCENT: 0.2 % (ref 0–6)
GFR AFRICAN AMERICAN: >60
GFR NON-AFRICAN AMERICAN: >60 ML/MIN/1.73
GLUCOSE BLD-MCNC: 149 MG/DL (ref 74–99)
HCT VFR BLD CALC: 42.7 % (ref 37–54)
HEMOGLOBIN: 14.6 G/DL (ref 12.5–16.5)
IMMATURE GRANULOCYTES #: 0.11 E9/L
IMMATURE GRANULOCYTES %: 1.2 % (ref 0–5)
LYMPHOCYTES ABSOLUTE: 1.71 E9/L (ref 1.5–4)
LYMPHOCYTES RELATIVE PERCENT: 17.9 % (ref 20–42)
MAGNESIUM: 2.3 MG/DL (ref 1.6–2.6)
MCH RBC QN AUTO: 26.5 PG (ref 26–35)
MCHC RBC AUTO-ENTMCNC: 34.2 % (ref 32–34.5)
MCV RBC AUTO: 77.6 FL (ref 80–99.9)
MONOCYTES ABSOLUTE: 0.1 E9/L (ref 0.1–0.95)
MONOCYTES RELATIVE PERCENT: 1 % (ref 2–12)
NEUTROPHILS ABSOLUTE: 7.58 E9/L (ref 1.8–7.3)
NEUTROPHILS RELATIVE PERCENT: 79.6 % (ref 43–80)
PDW BLD-RTO: 13.2 FL (ref 11.5–15)
PLATELET # BLD: 245 E9/L (ref 130–450)
PMV BLD AUTO: 9.3 FL (ref 7–12)
POTASSIUM SERPL-SCNC: 4 MMOL/L (ref 3.5–5)
RBC # BLD: 5.5 E12/L (ref 3.8–5.8)
SODIUM BLD-SCNC: 134 MMOL/L (ref 132–146)
TOTAL PROTEIN: 7.2 G/DL (ref 6.4–8.3)
WBC # BLD: 9.5 E9/L (ref 4.5–11.5)

## 2019-02-25 PROCEDURE — G8419 CALC BMI OUT NRM PARAM NOF/U: HCPCS | Performed by: INTERNAL MEDICINE

## 2019-02-25 PROCEDURE — 6360000002 HC RX W HCPCS: Performed by: INTERNAL MEDICINE

## 2019-02-25 PROCEDURE — 99214 OFFICE O/P EST MOD 30 MIN: CPT | Performed by: INTERNAL MEDICINE

## 2019-02-25 PROCEDURE — 2580000003 HC RX 258: Performed by: INTERNAL MEDICINE

## 2019-02-25 PROCEDURE — 83735 ASSAY OF MAGNESIUM: CPT

## 2019-02-25 PROCEDURE — 85025 COMPLETE CBC W/AUTO DIFF WBC: CPT

## 2019-02-25 PROCEDURE — G8427 DOCREV CUR MEDS BY ELIG CLIN: HCPCS | Performed by: INTERNAL MEDICINE

## 2019-02-25 PROCEDURE — 4004F PT TOBACCO SCREEN RCVD TLK: CPT | Performed by: INTERNAL MEDICINE

## 2019-02-25 PROCEDURE — 96415 CHEMO IV INFUSION ADDL HR: CPT

## 2019-02-25 PROCEDURE — 96413 CHEMO IV INFUSION 1 HR: CPT

## 2019-02-25 PROCEDURE — 96375 TX/PRO/DX INJ NEW DRUG ADDON: CPT

## 2019-02-25 PROCEDURE — G8599 NO ASA/ANTIPLAT THER USE RNG: HCPCS | Performed by: INTERNAL MEDICINE

## 2019-02-25 PROCEDURE — G8484 FLU IMMUNIZE NO ADMIN: HCPCS | Performed by: INTERNAL MEDICINE

## 2019-02-25 PROCEDURE — 77386 HC NTSTY MODUL RAD TX DLVR CPLX: CPT

## 2019-02-25 PROCEDURE — 80053 COMPREHEN METABOLIC PANEL: CPT

## 2019-02-25 RX ORDER — HEPARIN SODIUM (PORCINE) LOCK FLUSH IV SOLN 100 UNIT/ML 100 UNIT/ML
500 SOLUTION INTRAVENOUS PRN
Status: DISCONTINUED | OUTPATIENT
Start: 2019-02-25 | End: 2019-02-26 | Stop reason: HOSPADM

## 2019-02-25 RX ORDER — PALONOSETRON HYDROCHLORIDE 0.05 MG/ML
0.25 INJECTION, SOLUTION INTRAVENOUS ONCE
Status: COMPLETED | OUTPATIENT
Start: 2019-02-25 | End: 2019-02-25

## 2019-02-25 RX ORDER — DEXAMETHASONE SODIUM PHOSPHATE 10 MG/ML
10 INJECTION INTRAMUSCULAR; INTRAVENOUS ONCE
Status: COMPLETED | OUTPATIENT
Start: 2019-02-25 | End: 2019-02-25

## 2019-02-25 RX ORDER — SODIUM CHLORIDE 9 MG/ML
250 INJECTION, SOLUTION INTRAVENOUS ONCE
Status: COMPLETED | OUTPATIENT
Start: 2019-02-25 | End: 2019-02-25

## 2019-02-25 RX ORDER — SODIUM CHLORIDE 0.9 % (FLUSH) 0.9 %
10 SYRINGE (ML) INJECTION PRN
Status: DISCONTINUED | OUTPATIENT
Start: 2019-02-25 | End: 2019-02-26 | Stop reason: HOSPADM

## 2019-02-25 RX ADMIN — SODIUM CHLORIDE 150 MG: 9 INJECTION, SOLUTION INTRAVENOUS at 09:43

## 2019-02-25 RX ADMIN — SODIUM CHLORIDE 250 ML: 9 INJECTION, SOLUTION INTRAVENOUS at 09:34

## 2019-02-25 RX ADMIN — Medication 10 ML: at 09:33

## 2019-02-25 RX ADMIN — DEXAMETHASONE SODIUM PHOSPHATE 10 MG: 10 INJECTION INTRAMUSCULAR; INTRAVENOUS at 09:36

## 2019-02-25 RX ADMIN — Medication 10 ML: at 12:45

## 2019-02-25 RX ADMIN — PALONOSETRON 0.25 MG: 0.25 INJECTION, SOLUTION INTRAVENOUS at 09:34

## 2019-02-25 RX ADMIN — POTASSIUM CHLORIDE: 2 INJECTION, SOLUTION, CONCENTRATE INTRAVENOUS at 10:21

## 2019-02-25 RX ADMIN — SODIUM CHLORIDE, PRESERVATIVE FREE 500 UNITS: 5 INJECTION INTRAVENOUS at 12:45

## 2019-02-25 RX ADMIN — Medication 10 ML: at 09:36

## 2019-02-26 PROCEDURE — 77386 HC NTSTY MODUL RAD TX DLVR CPLX: CPT

## 2019-02-27 ENCOUNTER — HOSPITAL ENCOUNTER (OUTPATIENT)
Dept: RADIATION ONCOLOGY | Age: 49
Discharge: HOME | End: 2019-02-27
Payer: COMMERCIAL

## 2019-02-27 VITALS
WEIGHT: 208.5 LBS | BODY MASS INDEX: 27.51 KG/M2 | SYSTOLIC BLOOD PRESSURE: 102 MMHG | HEART RATE: 60 BPM | OXYGEN SATURATION: 96 % | DIASTOLIC BLOOD PRESSURE: 82 MMHG

## 2019-02-27 DIAGNOSIS — C80.1 CANCER (HCC): Primary | ICD-10-CM

## 2019-02-27 PROCEDURE — 77386 HC NTSTY MODUL RAD TX DLVR CPLX: CPT

## 2019-02-27 ASSESSMENT — PAIN DESCRIPTION - LOCATION: LOCATION: EAR

## 2019-02-28 ENCOUNTER — TELEPHONE (OUTPATIENT)
Dept: RADIATION ONCOLOGY | Age: 49
End: 2019-02-28

## 2019-02-28 PROCEDURE — 77386 HC NTSTY MODUL RAD TX DLVR CPLX: CPT

## 2019-03-01 ENCOUNTER — HOSPITAL ENCOUNTER (OUTPATIENT)
Dept: RADIATION ONCOLOGY | Age: 49
Discharge: HOME | End: 2019-03-01
Payer: COMMERCIAL

## 2019-03-01 PROCEDURE — 77386 HC NTSTY MODUL RAD TX DLVR CPLX: CPT

## 2019-03-01 PROCEDURE — 77385 HC NTSTY MODUL RAD TX DLVR SMPL: CPT

## 2019-03-01 PROCEDURE — 77336 RADIATION PHYSICS CONSULT: CPT

## 2019-03-04 ENCOUNTER — HOSPITAL ENCOUNTER (OUTPATIENT)
Dept: INFUSION THERAPY | Age: 49
Discharge: HOME | End: 2019-03-04
Payer: COMMERCIAL

## 2019-03-04 ENCOUNTER — OFFICE VISIT (OUTPATIENT)
Dept: ONCOLOGY | Age: 49
End: 2019-03-04
Payer: COMMERCIAL

## 2019-03-04 VITALS
SYSTOLIC BLOOD PRESSURE: 106 MMHG | BODY MASS INDEX: 27.51 KG/M2 | HEIGHT: 73 IN | HEART RATE: 64 BPM | TEMPERATURE: 97.6 F | WEIGHT: 207.6 LBS | DIASTOLIC BLOOD PRESSURE: 70 MMHG

## 2019-03-04 DIAGNOSIS — C34.90 NON-SMALL CELL LUNG CANCER, UNSPECIFIED LATERALITY (HCC): Primary | ICD-10-CM

## 2019-03-04 DIAGNOSIS — C34.11 PANCOAST TUMOR OF RIGHT LUNG (HCC): Primary | ICD-10-CM

## 2019-03-04 LAB
BASOPHILS ABSOLUTE: 0 E9/L (ref 0–0.2)
BASOPHILS RELATIVE PERCENT: 0 % (ref 0–2)
EOSINOPHILS ABSOLUTE: 0.06 E9/L (ref 0.05–0.5)
EOSINOPHILS RELATIVE PERCENT: 1.8 % (ref 0–6)
HCT VFR BLD CALC: 32.9 % (ref 37–54)
HEMOGLOBIN: 11 G/DL (ref 12.5–16.5)
LYMPHOCYTES ABSOLUTE: 0.96 E9/L (ref 1.5–4)
LYMPHOCYTES RELATIVE PERCENT: 30.1 % (ref 20–42)
MAGNESIUM: 2 MG/DL (ref 1.6–2.6)
MCH RBC QN AUTO: 26.4 PG (ref 26–35)
MCHC RBC AUTO-ENTMCNC: 33.4 % (ref 32–34.5)
MCV RBC AUTO: 78.9 FL (ref 80–99.9)
MONOCYTES ABSOLUTE: 0.16 E9/L (ref 0.1–0.95)
MONOCYTES RELATIVE PERCENT: 5.3 % (ref 2–12)
NEUTROPHILS ABSOLUTE: 2.02 E9/L (ref 1.8–7.3)
NEUTROPHILS RELATIVE PERCENT: 62.8 % (ref 43–80)
OVALOCYTES: ABNORMAL
PDW BLD-RTO: 13.1 FL (ref 11.5–15)
PLATELET # BLD: 145 E9/L (ref 130–450)
PMV BLD AUTO: 9.3 FL (ref 7–12)
POIKILOCYTES: ABNORMAL
POLYCHROMASIA: ABNORMAL
RBC # BLD: 4.17 E12/L (ref 3.8–5.8)
WBC # BLD: 3.2 E9/L (ref 4.5–11.5)

## 2019-03-04 PROCEDURE — 83735 ASSAY OF MAGNESIUM: CPT

## 2019-03-04 PROCEDURE — 77386 HC NTSTY MODUL RAD TX DLVR CPLX: CPT

## 2019-03-04 PROCEDURE — 99212 OFFICE O/P EST SF 10 MIN: CPT | Performed by: INTERNAL MEDICINE

## 2019-03-04 PROCEDURE — 36591 DRAW BLOOD OFF VENOUS DEVICE: CPT

## 2019-03-04 PROCEDURE — 2580000003 HC RX 258

## 2019-03-04 PROCEDURE — 36415 COLL VENOUS BLD VENIPUNCTURE: CPT

## 2019-03-04 PROCEDURE — 99214 OFFICE O/P EST MOD 30 MIN: CPT

## 2019-03-04 PROCEDURE — G8599 NO ASA/ANTIPLAT THER USE RNG: HCPCS | Performed by: INTERNAL MEDICINE

## 2019-03-04 PROCEDURE — 4004F PT TOBACCO SCREEN RCVD TLK: CPT | Performed by: INTERNAL MEDICINE

## 2019-03-04 PROCEDURE — G8484 FLU IMMUNIZE NO ADMIN: HCPCS | Performed by: INTERNAL MEDICINE

## 2019-03-04 PROCEDURE — 99214 OFFICE O/P EST MOD 30 MIN: CPT | Performed by: INTERNAL MEDICINE

## 2019-03-04 PROCEDURE — G8419 CALC BMI OUT NRM PARAM NOF/U: HCPCS | Performed by: INTERNAL MEDICINE

## 2019-03-04 PROCEDURE — 85025 COMPLETE CBC W/AUTO DIFF WBC: CPT

## 2019-03-04 PROCEDURE — 6360000002 HC RX W HCPCS: Performed by: INTERNAL MEDICINE

## 2019-03-04 PROCEDURE — G8427 DOCREV CUR MEDS BY ELIG CLIN: HCPCS | Performed by: INTERNAL MEDICINE

## 2019-03-04 RX ORDER — HEPARIN SODIUM (PORCINE) LOCK FLUSH IV SOLN 100 UNIT/ML 100 UNIT/ML
500 SOLUTION INTRAVENOUS PRN
Status: DISCONTINUED | OUTPATIENT
Start: 2019-03-04 | End: 2019-03-05 | Stop reason: HOSPADM

## 2019-03-04 RX ORDER — SODIUM CHLORIDE 0.9 % (FLUSH) 0.9 %
SYRINGE (ML) INJECTION
Status: COMPLETED
Start: 2019-03-04 | End: 2019-03-04

## 2019-03-04 RX ORDER — SODIUM CHLORIDE 0.9 % (FLUSH) 0.9 %
10 SYRINGE (ML) INJECTION PRN
Status: DISCONTINUED | OUTPATIENT
Start: 2019-03-04 | End: 2019-03-05 | Stop reason: HOSPADM

## 2019-03-04 RX ORDER — HEPARIN SODIUM (PORCINE) LOCK FLUSH IV SOLN 100 UNIT/ML 100 UNIT/ML
500 SOLUTION INTRAVENOUS PRN
Status: CANCELLED | OUTPATIENT
Start: 2019-03-04

## 2019-03-04 RX ORDER — SODIUM CHLORIDE 0.9 % (FLUSH) 0.9 %
10 SYRINGE (ML) INJECTION PRN
Status: CANCELLED | OUTPATIENT
Start: 2019-03-04

## 2019-03-04 RX ADMIN — SODIUM CHLORIDE, PRESERVATIVE FREE 500 UNITS: 5 INJECTION INTRAVENOUS at 09:02

## 2019-03-04 RX ADMIN — Medication 10 ML: at 09:00

## 2019-03-04 RX ADMIN — Medication 10 ML: at 08:50

## 2019-03-05 PROCEDURE — 77386 HC NTSTY MODUL RAD TX DLVR CPLX: CPT

## 2019-03-06 ENCOUNTER — TELEPHONE (OUTPATIENT)
Dept: CASE MANAGEMENT | Age: 49
End: 2019-03-06

## 2019-03-06 ENCOUNTER — HOSPITAL ENCOUNTER (OUTPATIENT)
Dept: RADIATION ONCOLOGY | Age: 49
Discharge: HOME | End: 2019-03-06
Payer: COMMERCIAL

## 2019-03-06 VITALS
SYSTOLIC BLOOD PRESSURE: 98 MMHG | OXYGEN SATURATION: 98 % | DIASTOLIC BLOOD PRESSURE: 60 MMHG | BODY MASS INDEX: 27.84 KG/M2 | HEART RATE: 67 BPM | WEIGHT: 211 LBS

## 2019-03-06 DIAGNOSIS — C80.1 CANCER (HCC): Primary | ICD-10-CM

## 2019-03-06 PROCEDURE — 99999 PR OFFICE/OUTPT VISIT,PROCEDURE ONLY: CPT | Performed by: RADIOLOGY

## 2019-03-06 PROCEDURE — 77386 HC NTSTY MODUL RAD TX DLVR CPLX: CPT

## 2019-03-07 PROCEDURE — 77386 HC NTSTY MODUL RAD TX DLVR CPLX: CPT

## 2019-03-08 PROCEDURE — 77386 HC NTSTY MODUL RAD TX DLVR CPLX: CPT

## 2019-03-08 PROCEDURE — 77387 GUIDANCE FOR RADJ TX DLVR: CPT

## 2019-03-08 PROCEDURE — 77336 RADIATION PHYSICS CONSULT: CPT

## 2019-03-08 PROCEDURE — 77412 RADIATION TX DELIVERY LVL 3: CPT

## 2019-03-11 PROCEDURE — 77386 HC NTSTY MODUL RAD TX DLVR CPLX: CPT

## 2019-03-12 PROCEDURE — 77386 HC NTSTY MODUL RAD TX DLVR CPLX: CPT

## 2019-03-13 ENCOUNTER — HOSPITAL ENCOUNTER (OUTPATIENT)
Dept: RADIATION ONCOLOGY | Age: 49
Discharge: HOME | End: 2019-03-13
Payer: COMMERCIAL

## 2019-03-13 VITALS
DIASTOLIC BLOOD PRESSURE: 74 MMHG | SYSTOLIC BLOOD PRESSURE: 108 MMHG | HEART RATE: 68 BPM | OXYGEN SATURATION: 98 % | WEIGHT: 206.4 LBS | BODY MASS INDEX: 27.23 KG/M2

## 2019-03-13 DIAGNOSIS — C80.1 CANCER (HCC): Primary | ICD-10-CM

## 2019-03-13 PROCEDURE — 77386 HC NTSTY MODUL RAD TX DLVR CPLX: CPT

## 2019-03-13 PROCEDURE — 99999 PR OFFICE/OUTPT VISIT,PROCEDURE ONLY: CPT | Performed by: RADIOLOGY

## 2019-03-14 DIAGNOSIS — C34.11 PANCOAST TUMOR OF RIGHT LUNG (HCC): Primary | ICD-10-CM

## 2019-03-14 PROCEDURE — 77386 HC NTSTY MODUL RAD TX DLVR CPLX: CPT

## 2019-03-14 RX ORDER — SODIUM CHLORIDE 0.9 % (FLUSH) 0.9 %
10 SYRINGE (ML) INJECTION PRN
Status: CANCELLED | OUTPATIENT
Start: 2019-03-14

## 2019-03-14 RX ORDER — HEPARIN SODIUM (PORCINE) LOCK FLUSH IV SOLN 100 UNIT/ML 100 UNIT/ML
500 SOLUTION INTRAVENOUS PRN
Status: CANCELLED | OUTPATIENT
Start: 2019-03-14

## 2019-03-14 RX ORDER — SODIUM CHLORIDE 0.9 % (FLUSH) 0.9 %
20 SYRINGE (ML) INJECTION PRN
Status: CANCELLED | OUTPATIENT
Start: 2019-03-14

## 2019-03-15 PROCEDURE — 77336 RADIATION PHYSICS CONSULT: CPT

## 2019-03-15 PROCEDURE — 77386 HC NTSTY MODUL RAD TX DLVR CPLX: CPT

## 2019-03-18 ENCOUNTER — HOSPITAL ENCOUNTER (OUTPATIENT)
Dept: INFUSION THERAPY | Age: 49
Discharge: HOME | End: 2019-03-18
Payer: COMMERCIAL

## 2019-03-18 ENCOUNTER — OFFICE VISIT (OUTPATIENT)
Dept: ONCOLOGY | Age: 49
End: 2019-03-18
Payer: COMMERCIAL

## 2019-03-18 ENCOUNTER — HOSPITAL ENCOUNTER (OUTPATIENT)
Age: 49
Discharge: HOME | End: 2019-03-18
Payer: COMMERCIAL

## 2019-03-18 VITALS
HEART RATE: 66 BPM | BODY MASS INDEX: 27.4 KG/M2 | WEIGHT: 206.7 LBS | RESPIRATION RATE: 20 BRPM | DIASTOLIC BLOOD PRESSURE: 74 MMHG | HEIGHT: 73 IN | TEMPERATURE: 97.8 F | SYSTOLIC BLOOD PRESSURE: 120 MMHG

## 2019-03-18 VITALS — HEART RATE: 62 BPM | RESPIRATION RATE: 18 BRPM | DIASTOLIC BLOOD PRESSURE: 86 MMHG | SYSTOLIC BLOOD PRESSURE: 134 MMHG

## 2019-03-18 DIAGNOSIS — C34.10 PANCOAST TUMOR, UNSPECIFIED LATERALITY (HCC): Primary | ICD-10-CM

## 2019-03-18 DIAGNOSIS — C34.10 PANCOAST TUMOR, UNSPECIFIED LATERALITY (HCC): ICD-10-CM

## 2019-03-18 DIAGNOSIS — C34.11 PANCOAST TUMOR OF RIGHT LUNG (HCC): ICD-10-CM

## 2019-03-18 DIAGNOSIS — C34.11 PANCOAST TUMOR OF RIGHT LUNG (HCC): Primary | ICD-10-CM

## 2019-03-18 LAB
ALBUMIN SERPL-MCNC: 3.9 G/DL (ref 3.5–5.2)
ALP BLD-CCNC: 87 U/L (ref 40–129)
ALT SERPL-CCNC: 19 U/L (ref 0–40)
ANION GAP SERPL CALCULATED.3IONS-SCNC: 10 MMOL/L (ref 7–16)
AST SERPL-CCNC: 17 U/L (ref 0–39)
BASOPHILS ABSOLUTE: 0.03 E9/L (ref 0–0.2)
BASOPHILS RELATIVE PERCENT: 0.5 % (ref 0–2)
BILIRUB SERPL-MCNC: <0.2 MG/DL (ref 0–1.2)
BUN BLDV-MCNC: 12 MG/DL (ref 6–20)
CALCIUM SERPL-MCNC: 9.1 MG/DL (ref 8.6–10.2)
CHLORIDE BLD-SCNC: 102 MMOL/L (ref 98–107)
CO2: 28 MMOL/L (ref 22–29)
CREAT SERPL-MCNC: 1.1 MG/DL (ref 0.7–1.2)
EOSINOPHILS ABSOLUTE: 0.06 E9/L (ref 0.05–0.5)
EOSINOPHILS RELATIVE PERCENT: 0.9 % (ref 0–6)
GFR AFRICAN AMERICAN: >60
GFR NON-AFRICAN AMERICAN: >60 ML/MIN/1.73
GLUCOSE BLD-MCNC: 85 MG/DL (ref 74–99)
HCT VFR BLD CALC: 39.4 % (ref 37–54)
HEMOGLOBIN: 13 G/DL (ref 12.5–16.5)
IMMATURE GRANULOCYTES #: 0.12 E9/L
IMMATURE GRANULOCYTES %: 1.8 % (ref 0–5)
LYMPHOCYTES ABSOLUTE: 1.28 E9/L (ref 1.5–4)
LYMPHOCYTES RELATIVE PERCENT: 19.3 % (ref 20–42)
MAGNESIUM: 2.3 MG/DL (ref 1.6–2.6)
MCH RBC QN AUTO: 26.9 PG (ref 26–35)
MCHC RBC AUTO-ENTMCNC: 33 % (ref 32–34.5)
MCV RBC AUTO: 81.4 FL (ref 80–99.9)
MONOCYTES ABSOLUTE: 1.01 E9/L (ref 0.1–0.95)
MONOCYTES RELATIVE PERCENT: 15.2 % (ref 2–12)
NEUTROPHILS ABSOLUTE: 4.13 E9/L (ref 1.8–7.3)
NEUTROPHILS RELATIVE PERCENT: 62.3 % (ref 43–80)
PDW BLD-RTO: 16 FL (ref 11.5–15)
PLATELET # BLD: 337 E9/L (ref 130–450)
PMV BLD AUTO: 8.6 FL (ref 7–12)
POTASSIUM SERPL-SCNC: 4.6 MMOL/L (ref 3.5–5)
RBC # BLD: 4.84 E12/L (ref 3.8–5.8)
SODIUM BLD-SCNC: 140 MMOL/L (ref 132–146)
TOTAL PROTEIN: 7.1 G/DL (ref 6.4–8.3)
WBC # BLD: 6.6 E9/L (ref 4.5–11.5)

## 2019-03-18 PROCEDURE — 85025 COMPLETE CBC W/AUTO DIFF WBC: CPT

## 2019-03-18 PROCEDURE — G8599 NO ASA/ANTIPLAT THER USE RNG: HCPCS | Performed by: INTERNAL MEDICINE

## 2019-03-18 PROCEDURE — 96413 CHEMO IV INFUSION 1 HR: CPT

## 2019-03-18 PROCEDURE — 2580000003 HC RX 258: Performed by: INTERNAL MEDICINE

## 2019-03-18 PROCEDURE — 96417 CHEMO IV INFUS EACH ADDL SEQ: CPT

## 2019-03-18 PROCEDURE — 83735 ASSAY OF MAGNESIUM: CPT

## 2019-03-18 PROCEDURE — G8428 CUR MEDS NOT DOCUMENT: HCPCS | Performed by: INTERNAL MEDICINE

## 2019-03-18 PROCEDURE — 4004F PT TOBACCO SCREEN RCVD TLK: CPT | Performed by: INTERNAL MEDICINE

## 2019-03-18 PROCEDURE — 77386 HC NTSTY MODUL RAD TX DLVR CPLX: CPT

## 2019-03-18 PROCEDURE — 80053 COMPREHEN METABOLIC PANEL: CPT

## 2019-03-18 PROCEDURE — G8419 CALC BMI OUT NRM PARAM NOF/U: HCPCS | Performed by: INTERNAL MEDICINE

## 2019-03-18 PROCEDURE — 96375 TX/PRO/DX INJ NEW DRUG ADDON: CPT

## 2019-03-18 PROCEDURE — 96366 THER/PROPH/DIAG IV INF ADDON: CPT

## 2019-03-18 PROCEDURE — 6360000002 HC RX W HCPCS: Performed by: INTERNAL MEDICINE

## 2019-03-18 PROCEDURE — 99214 OFFICE O/P EST MOD 30 MIN: CPT | Performed by: INTERNAL MEDICINE

## 2019-03-18 PROCEDURE — G8484 FLU IMMUNIZE NO ADMIN: HCPCS | Performed by: INTERNAL MEDICINE

## 2019-03-18 PROCEDURE — 96415 CHEMO IV INFUSION ADDL HR: CPT

## 2019-03-18 RX ORDER — EPINEPHRINE 1 MG/ML
0.3 INJECTION, SOLUTION, CONCENTRATE INTRAVENOUS PRN
Status: CANCELLED | OUTPATIENT
Start: 2019-03-21

## 2019-03-18 RX ORDER — SODIUM CHLORIDE 9 MG/ML
INJECTION, SOLUTION INTRAVENOUS CONTINUOUS
Status: CANCELLED | OUTPATIENT
Start: 2019-03-21

## 2019-03-18 RX ORDER — SODIUM CHLORIDE 0.9 % (FLUSH) 0.9 %
5 SYRINGE (ML) INJECTION PRN
Status: CANCELLED | OUTPATIENT
Start: 2019-03-18

## 2019-03-18 RX ORDER — METHYLPREDNISOLONE SODIUM SUCCINATE 125 MG/2ML
125 INJECTION, POWDER, LYOPHILIZED, FOR SOLUTION INTRAMUSCULAR; INTRAVENOUS ONCE
Status: CANCELLED | OUTPATIENT
Start: 2019-03-18 | End: 2019-03-18

## 2019-03-18 RX ORDER — DIPHENHYDRAMINE HYDROCHLORIDE 50 MG/ML
50 INJECTION INTRAMUSCULAR; INTRAVENOUS ONCE
Status: CANCELLED | OUTPATIENT
Start: 2019-03-21 | End: 2019-03-21

## 2019-03-18 RX ORDER — DIPHENHYDRAMINE HYDROCHLORIDE 50 MG/ML
50 INJECTION INTRAMUSCULAR; INTRAVENOUS ONCE
Status: CANCELLED | OUTPATIENT
Start: 2019-03-25 | End: 2019-03-25

## 2019-03-18 RX ORDER — METHYLPREDNISOLONE SODIUM SUCCINATE 125 MG/2ML
125 INJECTION, POWDER, LYOPHILIZED, FOR SOLUTION INTRAMUSCULAR; INTRAVENOUS ONCE
Status: CANCELLED | OUTPATIENT
Start: 2019-03-22 | End: 2019-03-22

## 2019-03-18 RX ORDER — SODIUM CHLORIDE 9 MG/ML
INJECTION, SOLUTION INTRAVENOUS CONTINUOUS
Status: CANCELLED | OUTPATIENT
Start: 2019-03-19

## 2019-03-18 RX ORDER — SODIUM CHLORIDE 9 MG/ML
INJECTION, SOLUTION INTRAVENOUS CONTINUOUS
Status: CANCELLED | OUTPATIENT
Start: 2019-03-25

## 2019-03-18 RX ORDER — SODIUM CHLORIDE 9 MG/ML
INJECTION, SOLUTION INTRAVENOUS ONCE
Status: CANCELLED | OUTPATIENT
Start: 2019-03-19 | End: 2019-03-19

## 2019-03-18 RX ORDER — SODIUM CHLORIDE 0.9 % (FLUSH) 0.9 %
10 SYRINGE (ML) INJECTION PRN
Status: CANCELLED | OUTPATIENT
Start: 2019-03-18

## 2019-03-18 RX ORDER — SODIUM CHLORIDE 9 MG/ML
INJECTION, SOLUTION INTRAVENOUS ONCE
Status: CANCELLED | OUTPATIENT
Start: 2019-03-25 | End: 2019-03-25

## 2019-03-18 RX ORDER — DEXAMETHASONE SODIUM PHOSPHATE 10 MG/ML
10 INJECTION INTRAMUSCULAR; INTRAVENOUS ONCE
Status: COMPLETED | OUTPATIENT
Start: 2019-03-18 | End: 2019-03-18

## 2019-03-18 RX ORDER — EPINEPHRINE 1 MG/ML
0.3 INJECTION, SOLUTION, CONCENTRATE INTRAVENOUS PRN
Status: CANCELLED | OUTPATIENT
Start: 2019-03-19

## 2019-03-18 RX ORDER — SODIUM CHLORIDE 0.9 % (FLUSH) 0.9 %
10 SYRINGE (ML) INJECTION PRN
Status: CANCELLED | OUTPATIENT
Start: 2019-03-25

## 2019-03-18 RX ORDER — SODIUM CHLORIDE 0.9 % (FLUSH) 0.9 %
5 SYRINGE (ML) INJECTION PRN
Status: CANCELLED | OUTPATIENT
Start: 2019-03-19

## 2019-03-18 RX ORDER — 0.9 % SODIUM CHLORIDE 0.9 %
10 VIAL (ML) INJECTION ONCE
Status: CANCELLED | OUTPATIENT
Start: 2019-03-22 | End: 2019-03-22

## 2019-03-18 RX ORDER — SODIUM CHLORIDE 9 MG/ML
INJECTION, SOLUTION INTRAVENOUS CONTINUOUS
Status: CANCELLED | OUTPATIENT
Start: 2019-03-22

## 2019-03-18 RX ORDER — EPINEPHRINE 1 MG/ML
0.3 INJECTION, SOLUTION, CONCENTRATE INTRAVENOUS PRN
Status: CANCELLED | OUTPATIENT
Start: 2019-03-20

## 2019-03-18 RX ORDER — DEXAMETHASONE SODIUM PHOSPHATE 10 MG/ML
10 INJECTION, SOLUTION INTRAMUSCULAR; INTRAVENOUS ONCE
Status: CANCELLED | OUTPATIENT
Start: 2019-03-25

## 2019-03-18 RX ORDER — SODIUM CHLORIDE 0.9 % (FLUSH) 0.9 %
5 SYRINGE (ML) INJECTION PRN
Status: CANCELLED | OUTPATIENT
Start: 2019-03-21

## 2019-03-18 RX ORDER — SODIUM CHLORIDE 0.9 % (FLUSH) 0.9 %
5 SYRINGE (ML) INJECTION PRN
Status: CANCELLED | OUTPATIENT
Start: 2019-03-22

## 2019-03-18 RX ORDER — SODIUM CHLORIDE 0.9 % (FLUSH) 0.9 %
10 SYRINGE (ML) INJECTION PRN
Status: CANCELLED | OUTPATIENT
Start: 2019-03-19

## 2019-03-18 RX ORDER — HEPARIN SODIUM (PORCINE) LOCK FLUSH IV SOLN 100 UNIT/ML 100 UNIT/ML
500 SOLUTION INTRAVENOUS PRN
Status: CANCELLED | OUTPATIENT
Start: 2019-03-20

## 2019-03-18 RX ORDER — SODIUM CHLORIDE 9 MG/ML
INJECTION, SOLUTION INTRAVENOUS CONTINUOUS
Status: CANCELLED | OUTPATIENT
Start: 2019-03-20

## 2019-03-18 RX ORDER — HEPARIN SODIUM (PORCINE) LOCK FLUSH IV SOLN 100 UNIT/ML 100 UNIT/ML
500 SOLUTION INTRAVENOUS PRN
Status: CANCELLED | OUTPATIENT
Start: 2019-03-22

## 2019-03-18 RX ORDER — SODIUM CHLORIDE 9 MG/ML
250 INJECTION, SOLUTION INTRAVENOUS ONCE
Status: COMPLETED | OUTPATIENT
Start: 2019-03-18 | End: 2019-03-18

## 2019-03-18 RX ORDER — 0.9 % SODIUM CHLORIDE 0.9 %
10 VIAL (ML) INJECTION ONCE
Status: CANCELLED | OUTPATIENT
Start: 2019-03-20 | End: 2019-03-20

## 2019-03-18 RX ORDER — SODIUM CHLORIDE 9 MG/ML
INJECTION, SOLUTION INTRAVENOUS ONCE
Status: CANCELLED | OUTPATIENT
Start: 2019-03-20 | End: 2019-03-20

## 2019-03-18 RX ORDER — 0.9 % SODIUM CHLORIDE 0.9 %
10 VIAL (ML) INJECTION ONCE
Status: CANCELLED | OUTPATIENT
Start: 2019-03-21 | End: 2019-03-21

## 2019-03-18 RX ORDER — METHYLPREDNISOLONE SODIUM SUCCINATE 125 MG/2ML
125 INJECTION, POWDER, LYOPHILIZED, FOR SOLUTION INTRAMUSCULAR; INTRAVENOUS ONCE
Status: CANCELLED | OUTPATIENT
Start: 2019-03-21 | End: 2019-03-21

## 2019-03-18 RX ORDER — PALONOSETRON HYDROCHLORIDE 0.05 MG/ML
0.25 INJECTION, SOLUTION INTRAVENOUS ONCE
Status: COMPLETED | OUTPATIENT
Start: 2019-03-18 | End: 2019-03-18

## 2019-03-18 RX ORDER — SODIUM CHLORIDE 0.9 % (FLUSH) 0.9 %
10 SYRINGE (ML) INJECTION PRN
Status: DISCONTINUED | OUTPATIENT
Start: 2019-03-18 | End: 2019-03-19 | Stop reason: HOSPADM

## 2019-03-18 RX ORDER — SODIUM CHLORIDE 0.9 % (FLUSH) 0.9 %
10 SYRINGE (ML) INJECTION PRN
Status: CANCELLED | OUTPATIENT
Start: 2019-03-22

## 2019-03-18 RX ORDER — EPINEPHRINE 1 MG/ML
0.3 INJECTION, SOLUTION, CONCENTRATE INTRAVENOUS PRN
Status: CANCELLED | OUTPATIENT
Start: 2019-03-25

## 2019-03-18 RX ORDER — HEPARIN SODIUM (PORCINE) LOCK FLUSH IV SOLN 100 UNIT/ML 100 UNIT/ML
500 SOLUTION INTRAVENOUS PRN
Status: CANCELLED | OUTPATIENT
Start: 2019-03-25

## 2019-03-18 RX ORDER — SODIUM CHLORIDE 0.9 % (FLUSH) 0.9 %
5 SYRINGE (ML) INJECTION PRN
Status: CANCELLED | OUTPATIENT
Start: 2019-03-20

## 2019-03-18 RX ORDER — SODIUM CHLORIDE 0.9 % (FLUSH) 0.9 %
10 SYRINGE (ML) INJECTION PRN
Status: CANCELLED | OUTPATIENT
Start: 2019-03-21

## 2019-03-18 RX ORDER — SODIUM CHLORIDE 9 MG/ML
INJECTION, SOLUTION INTRAVENOUS ONCE
Status: CANCELLED | OUTPATIENT
Start: 2019-03-18 | End: 2019-03-18

## 2019-03-18 RX ORDER — SODIUM CHLORIDE 9 MG/ML
INJECTION, SOLUTION INTRAVENOUS ONCE
Status: CANCELLED | OUTPATIENT
Start: 2019-03-22 | End: 2019-03-22

## 2019-03-18 RX ORDER — METHYLPREDNISOLONE SODIUM SUCCINATE 125 MG/2ML
125 INJECTION, POWDER, LYOPHILIZED, FOR SOLUTION INTRAMUSCULAR; INTRAVENOUS ONCE
Status: CANCELLED | OUTPATIENT
Start: 2019-03-19 | End: 2019-03-19

## 2019-03-18 RX ORDER — DIPHENHYDRAMINE HYDROCHLORIDE 50 MG/ML
50 INJECTION INTRAMUSCULAR; INTRAVENOUS ONCE
Status: CANCELLED | OUTPATIENT
Start: 2019-03-20 | End: 2019-03-20

## 2019-03-18 RX ORDER — METHYLPREDNISOLONE SODIUM SUCCINATE 125 MG/2ML
125 INJECTION, POWDER, LYOPHILIZED, FOR SOLUTION INTRAMUSCULAR; INTRAVENOUS ONCE
Status: CANCELLED | OUTPATIENT
Start: 2019-03-25 | End: 2019-03-25

## 2019-03-18 RX ORDER — 0.9 % SODIUM CHLORIDE 0.9 %
10 VIAL (ML) INJECTION ONCE
Status: CANCELLED | OUTPATIENT
Start: 2019-03-19 | End: 2019-03-19

## 2019-03-18 RX ORDER — HEPARIN SODIUM (PORCINE) LOCK FLUSH IV SOLN 100 UNIT/ML 100 UNIT/ML
500 SOLUTION INTRAVENOUS PRN
Status: CANCELLED | OUTPATIENT
Start: 2019-03-18

## 2019-03-18 RX ORDER — HEPARIN SODIUM (PORCINE) LOCK FLUSH IV SOLN 100 UNIT/ML 100 UNIT/ML
500 SOLUTION INTRAVENOUS PRN
Status: CANCELLED | OUTPATIENT
Start: 2019-03-21

## 2019-03-18 RX ORDER — EPINEPHRINE 1 MG/ML
0.3 INJECTION, SOLUTION, CONCENTRATE INTRAVENOUS PRN
Status: CANCELLED | OUTPATIENT
Start: 2019-03-18

## 2019-03-18 RX ORDER — METHYLPREDNISOLONE SODIUM SUCCINATE 125 MG/2ML
125 INJECTION, POWDER, LYOPHILIZED, FOR SOLUTION INTRAMUSCULAR; INTRAVENOUS ONCE
Status: CANCELLED | OUTPATIENT
Start: 2019-03-20 | End: 2019-03-20

## 2019-03-18 RX ORDER — 0.9 % SODIUM CHLORIDE 0.9 %
10 VIAL (ML) INJECTION ONCE
Status: CANCELLED | OUTPATIENT
Start: 2019-03-18 | End: 2019-03-18

## 2019-03-18 RX ORDER — SODIUM CHLORIDE 9 MG/ML
INJECTION, SOLUTION INTRAVENOUS ONCE
Status: CANCELLED | OUTPATIENT
Start: 2019-03-21 | End: 2019-03-21

## 2019-03-18 RX ORDER — DEXAMETHASONE SODIUM PHOSPHATE 10 MG/ML
10 INJECTION, SOLUTION INTRAMUSCULAR; INTRAVENOUS ONCE
Status: CANCELLED | OUTPATIENT
Start: 2019-03-19

## 2019-03-18 RX ORDER — HEPARIN SODIUM (PORCINE) LOCK FLUSH IV SOLN 100 UNIT/ML 100 UNIT/ML
500 SOLUTION INTRAVENOUS PRN
Status: CANCELLED | OUTPATIENT
Start: 2019-03-19

## 2019-03-18 RX ORDER — HEPARIN SODIUM (PORCINE) LOCK FLUSH IV SOLN 100 UNIT/ML 100 UNIT/ML
500 SOLUTION INTRAVENOUS PRN
Status: DISCONTINUED | OUTPATIENT
Start: 2019-03-18 | End: 2019-03-19 | Stop reason: HOSPADM

## 2019-03-18 RX ORDER — SODIUM CHLORIDE 0.9 % (FLUSH) 0.9 %
10 SYRINGE (ML) INJECTION PRN
Status: CANCELLED | OUTPATIENT
Start: 2019-03-20

## 2019-03-18 RX ORDER — DEXAMETHASONE SODIUM PHOSPHATE 10 MG/ML
10 INJECTION, SOLUTION INTRAMUSCULAR; INTRAVENOUS ONCE
Status: CANCELLED | OUTPATIENT
Start: 2019-03-18

## 2019-03-18 RX ORDER — DEXAMETHASONE SODIUM PHOSPHATE 10 MG/ML
10 INJECTION, SOLUTION INTRAMUSCULAR; INTRAVENOUS ONCE
Status: CANCELLED | OUTPATIENT
Start: 2019-03-22

## 2019-03-18 RX ORDER — EPINEPHRINE 1 MG/ML
0.3 INJECTION, SOLUTION, CONCENTRATE INTRAVENOUS PRN
Status: CANCELLED | OUTPATIENT
Start: 2019-03-22

## 2019-03-18 RX ORDER — DIPHENHYDRAMINE HYDROCHLORIDE 50 MG/ML
50 INJECTION INTRAMUSCULAR; INTRAVENOUS ONCE
Status: CANCELLED | OUTPATIENT
Start: 2019-03-22 | End: 2019-03-22

## 2019-03-18 RX ORDER — PALONOSETRON 0.05 MG/ML
0.25 INJECTION, SOLUTION INTRAVENOUS ONCE
Status: CANCELLED | OUTPATIENT
Start: 2019-03-18

## 2019-03-18 RX ORDER — DIPHENHYDRAMINE HYDROCHLORIDE 50 MG/ML
50 INJECTION INTRAMUSCULAR; INTRAVENOUS ONCE
Status: CANCELLED | OUTPATIENT
Start: 2019-03-19 | End: 2019-03-19

## 2019-03-18 RX ORDER — SODIUM CHLORIDE 9 MG/ML
INJECTION, SOLUTION INTRAVENOUS CONTINUOUS
Status: CANCELLED | OUTPATIENT
Start: 2019-03-18

## 2019-03-18 RX ORDER — DEXAMETHASONE SODIUM PHOSPHATE 10 MG/ML
10 INJECTION, SOLUTION INTRAMUSCULAR; INTRAVENOUS ONCE
Status: CANCELLED | OUTPATIENT
Start: 2019-03-21

## 2019-03-18 RX ORDER — DEXAMETHASONE SODIUM PHOSPHATE 10 MG/ML
10 INJECTION, SOLUTION INTRAMUSCULAR; INTRAVENOUS ONCE
Status: CANCELLED | OUTPATIENT
Start: 2019-03-20

## 2019-03-18 RX ORDER — DIPHENHYDRAMINE HYDROCHLORIDE 50 MG/ML
50 INJECTION INTRAMUSCULAR; INTRAVENOUS ONCE
Status: CANCELLED | OUTPATIENT
Start: 2019-03-18 | End: 2019-03-18

## 2019-03-18 RX ORDER — SODIUM CHLORIDE 0.9 % (FLUSH) 0.9 %
5 SYRINGE (ML) INJECTION PRN
Status: CANCELLED | OUTPATIENT
Start: 2019-03-25

## 2019-03-18 RX ORDER — PALONOSETRON 0.05 MG/ML
0.25 INJECTION, SOLUTION INTRAVENOUS ONCE
Status: CANCELLED | OUTPATIENT
Start: 2019-03-25

## 2019-03-18 RX ORDER — 0.9 % SODIUM CHLORIDE 0.9 %
10 VIAL (ML) INJECTION ONCE
Status: CANCELLED | OUTPATIENT
Start: 2019-03-25 | End: 2019-03-25

## 2019-03-18 RX ADMIN — DEXAMETHASONE SODIUM PHOSPHATE 10 MG: 10 INJECTION INTRAMUSCULAR; INTRAVENOUS at 09:21

## 2019-03-18 RX ADMIN — PALONOSETRON 0.25 MG: 0.25 INJECTION, SOLUTION INTRAVENOUS at 09:21

## 2019-03-18 RX ADMIN — POTASSIUM CHLORIDE: 2 INJECTION, SOLUTION, CONCENTRATE INTRAVENOUS at 10:19

## 2019-03-18 RX ADMIN — Medication 10 ML: at 14:18

## 2019-03-18 RX ADMIN — ETOPOSIDE 120 MG: 20 INJECTION, SOLUTION, CONCENTRATE INTRAVENOUS at 13:13

## 2019-03-18 RX ADMIN — Medication 10 ML: at 08:33

## 2019-03-18 RX ADMIN — Medication 10 ML: at 13:11

## 2019-03-18 RX ADMIN — SODIUM CHLORIDE 250 ML: 9 INJECTION, SOLUTION INTRAVENOUS at 09:20

## 2019-03-18 RX ADMIN — HEPARIN 500 UNITS: 100 SYRINGE at 14:17

## 2019-03-18 RX ADMIN — SODIUM CHLORIDE 150 MG: 9 INJECTION, SOLUTION INTRAVENOUS at 09:43

## 2019-03-18 RX ADMIN — Medication 10 ML: at 08:32

## 2019-03-18 NOTE — PROGRESS NOTES
Met with patient today for follow up. He has completed 3 chemo and 20/33 radiation treatments. He is doing well. He denies esophagitis or difficulty eating. No n/v/d/c. Provided encouragement on adequate hydration as well as avoiding irritating foods. Continue to follow as needed.  Vaishnavi Barrera RD,,LD

## 2019-03-18 NOTE — PROGRESS NOTES
Pt presents to clinic for anti cancer therapy. Pt tolerated treatment well without complications. Pt was discharged ambulatory in stable condition.

## 2019-03-19 ENCOUNTER — TELEPHONE (OUTPATIENT)
Dept: CASE MANAGEMENT | Age: 49
End: 2019-03-19

## 2019-03-19 ENCOUNTER — HOSPITAL ENCOUNTER (OUTPATIENT)
Dept: INFUSION THERAPY | Age: 49
Discharge: HOME | End: 2019-03-19
Payer: COMMERCIAL

## 2019-03-19 VITALS — SYSTOLIC BLOOD PRESSURE: 132 MMHG | RESPIRATION RATE: 18 BRPM | HEART RATE: 68 BPM | DIASTOLIC BLOOD PRESSURE: 75 MMHG

## 2019-03-19 DIAGNOSIS — C34.10 PANCOAST TUMOR, UNSPECIFIED LATERALITY (HCC): Primary | ICD-10-CM

## 2019-03-19 PROCEDURE — 6360000002 HC RX W HCPCS: Performed by: INTERNAL MEDICINE

## 2019-03-19 PROCEDURE — 2580000003 HC RX 258: Performed by: INTERNAL MEDICINE

## 2019-03-19 PROCEDURE — 77386 HC NTSTY MODUL RAD TX DLVR CPLX: CPT

## 2019-03-19 PROCEDURE — 96375 TX/PRO/DX INJ NEW DRUG ADDON: CPT

## 2019-03-19 PROCEDURE — 96413 CHEMO IV INFUSION 1 HR: CPT

## 2019-03-19 RX ORDER — SODIUM CHLORIDE 0.9 % (FLUSH) 0.9 %
10 SYRINGE (ML) INJECTION PRN
Status: DISCONTINUED | OUTPATIENT
Start: 2019-03-19 | End: 2019-03-20 | Stop reason: HOSPADM

## 2019-03-19 RX ORDER — SODIUM CHLORIDE 9 MG/ML
250 INJECTION, SOLUTION INTRAVENOUS ONCE
Status: COMPLETED | OUTPATIENT
Start: 2019-03-19 | End: 2019-03-19

## 2019-03-19 RX ORDER — DEXAMETHASONE SODIUM PHOSPHATE 10 MG/ML
10 INJECTION INTRAMUSCULAR; INTRAVENOUS ONCE
Status: COMPLETED | OUTPATIENT
Start: 2019-03-19 | End: 2019-03-19

## 2019-03-19 RX ORDER — HEPARIN SODIUM (PORCINE) LOCK FLUSH IV SOLN 100 UNIT/ML 100 UNIT/ML
500 SOLUTION INTRAVENOUS PRN
Status: DISCONTINUED | OUTPATIENT
Start: 2019-03-19 | End: 2019-03-20 | Stop reason: HOSPADM

## 2019-03-19 RX ADMIN — Medication 10 ML: at 14:49

## 2019-03-19 RX ADMIN — ETOPOSIDE 120 MG: 20 INJECTION, SOLUTION, CONCENTRATE INTRAVENOUS at 13:30

## 2019-03-19 RX ADMIN — DEXAMETHASONE SODIUM PHOSPHATE 10 MG: 10 INJECTION INTRAMUSCULAR; INTRAVENOUS at 13:15

## 2019-03-19 RX ADMIN — HEPARIN 500 UNITS: 100 SYRINGE at 14:49

## 2019-03-19 RX ADMIN — SODIUM CHLORIDE 250 ML: 9 INJECTION, SOLUTION INTRAVENOUS at 13:15

## 2019-03-20 ENCOUNTER — HOSPITAL ENCOUNTER (OUTPATIENT)
Dept: INFUSION THERAPY | Age: 49
Discharge: HOME | End: 2019-03-20
Payer: COMMERCIAL

## 2019-03-20 ENCOUNTER — HOSPITAL ENCOUNTER (OUTPATIENT)
Dept: RADIATION ONCOLOGY | Age: 49
Discharge: HOME | End: 2019-03-20
Payer: COMMERCIAL

## 2019-03-20 VITALS
BODY MASS INDEX: 27.15 KG/M2 | HEART RATE: 64 BPM | SYSTOLIC BLOOD PRESSURE: 116 MMHG | WEIGHT: 205.8 LBS | DIASTOLIC BLOOD PRESSURE: 62 MMHG | OXYGEN SATURATION: 98 %

## 2019-03-20 VITALS
DIASTOLIC BLOOD PRESSURE: 75 MMHG | HEART RATE: 63 BPM | RESPIRATION RATE: 18 BRPM | SYSTOLIC BLOOD PRESSURE: 132 MMHG | TEMPERATURE: 98.9 F

## 2019-03-20 DIAGNOSIS — C34.10 PANCOAST TUMOR, UNSPECIFIED LATERALITY (HCC): Primary | ICD-10-CM

## 2019-03-20 PROCEDURE — 96375 TX/PRO/DX INJ NEW DRUG ADDON: CPT

## 2019-03-20 PROCEDURE — 6360000002 HC RX W HCPCS: Performed by: INTERNAL MEDICINE

## 2019-03-20 PROCEDURE — 2580000003 HC RX 258: Performed by: INTERNAL MEDICINE

## 2019-03-20 PROCEDURE — 77386 HC NTSTY MODUL RAD TX DLVR CPLX: CPT

## 2019-03-20 PROCEDURE — 96413 CHEMO IV INFUSION 1 HR: CPT

## 2019-03-20 RX ORDER — DEXAMETHASONE SODIUM PHOSPHATE 10 MG/ML
10 INJECTION INTRAMUSCULAR; INTRAVENOUS ONCE
Status: COMPLETED | OUTPATIENT
Start: 2019-03-20 | End: 2019-03-20

## 2019-03-20 RX ORDER — HEPARIN SODIUM (PORCINE) LOCK FLUSH IV SOLN 100 UNIT/ML 100 UNIT/ML
500 SOLUTION INTRAVENOUS PRN
Status: DISCONTINUED | OUTPATIENT
Start: 2019-03-20 | End: 2019-03-21 | Stop reason: HOSPADM

## 2019-03-20 RX ORDER — SODIUM CHLORIDE 9 MG/ML
250 INJECTION, SOLUTION INTRAVENOUS ONCE
Status: DISCONTINUED | OUTPATIENT
Start: 2019-03-20 | End: 2019-03-21 | Stop reason: HOSPADM

## 2019-03-20 RX ORDER — SODIUM CHLORIDE 0.9 % (FLUSH) 0.9 %
10 SYRINGE (ML) INJECTION PRN
Status: DISCONTINUED | OUTPATIENT
Start: 2019-03-20 | End: 2019-03-21 | Stop reason: HOSPADM

## 2019-03-20 RX ADMIN — DEXAMETHASONE SODIUM PHOSPHATE 10 MG: 10 INJECTION INTRAMUSCULAR; INTRAVENOUS at 13:20

## 2019-03-20 RX ADMIN — ETOPOSIDE 120 MG: 20 INJECTION, SOLUTION, CONCENTRATE INTRAVENOUS at 13:32

## 2019-03-20 RX ADMIN — HEPARIN 500 UNITS: 100 SYRINGE at 14:41

## 2019-03-20 RX ADMIN — SODIUM CHLORIDE 250 ML: 9 INJECTION, SOLUTION INTRAVENOUS at 13:19

## 2019-03-20 RX ADMIN — Medication 10 ML: at 14:41

## 2019-03-21 ENCOUNTER — HOSPITAL ENCOUNTER (OUTPATIENT)
Dept: INFUSION THERAPY | Age: 49
Discharge: HOME | End: 2019-03-21
Payer: COMMERCIAL

## 2019-03-21 VITALS
HEART RATE: 54 BPM | SYSTOLIC BLOOD PRESSURE: 134 MMHG | DIASTOLIC BLOOD PRESSURE: 81 MMHG | RESPIRATION RATE: 18 BRPM | TEMPERATURE: 98.7 F

## 2019-03-21 DIAGNOSIS — C34.10 PANCOAST TUMOR, UNSPECIFIED LATERALITY (HCC): Primary | ICD-10-CM

## 2019-03-21 PROCEDURE — 96413 CHEMO IV INFUSION 1 HR: CPT

## 2019-03-21 PROCEDURE — 2580000003 HC RX 258: Performed by: INTERNAL MEDICINE

## 2019-03-21 PROCEDURE — 6360000002 HC RX W HCPCS: Performed by: INTERNAL MEDICINE

## 2019-03-21 PROCEDURE — 77386 HC NTSTY MODUL RAD TX DLVR CPLX: CPT

## 2019-03-21 RX ORDER — DEXAMETHASONE SODIUM PHOSPHATE 10 MG/ML
10 INJECTION INTRAMUSCULAR; INTRAVENOUS ONCE
Status: COMPLETED | OUTPATIENT
Start: 2019-03-21 | End: 2019-03-21

## 2019-03-21 RX ORDER — SODIUM CHLORIDE 0.9 % (FLUSH) 0.9 %
10 SYRINGE (ML) INJECTION PRN
Status: DISCONTINUED | OUTPATIENT
Start: 2019-03-21 | End: 2019-03-22 | Stop reason: HOSPADM

## 2019-03-21 RX ORDER — SODIUM CHLORIDE 9 MG/ML
250 INJECTION, SOLUTION INTRAVENOUS ONCE
Status: DISCONTINUED | OUTPATIENT
Start: 2019-03-21 | End: 2019-03-22 | Stop reason: HOSPADM

## 2019-03-21 RX ORDER — HEPARIN SODIUM (PORCINE) LOCK FLUSH IV SOLN 100 UNIT/ML 100 UNIT/ML
500 SOLUTION INTRAVENOUS PRN
Status: DISCONTINUED | OUTPATIENT
Start: 2019-03-21 | End: 2019-03-22 | Stop reason: HOSPADM

## 2019-03-21 RX ADMIN — HEPARIN 500 UNITS: 100 SYRINGE at 15:08

## 2019-03-21 RX ADMIN — Medication 10 ML: at 13:33

## 2019-03-21 RX ADMIN — Medication 10 ML: at 15:08

## 2019-03-21 RX ADMIN — SODIUM CHLORIDE 250 ML: 9 INJECTION, SOLUTION INTRAVENOUS at 13:28

## 2019-03-21 RX ADMIN — ETOPOSIDE 120 MG: 20 INJECTION, SOLUTION, CONCENTRATE INTRAVENOUS at 13:53

## 2019-03-21 RX ADMIN — DEXAMETHASONE SODIUM PHOSPHATE 10 MG: 10 INJECTION INTRAMUSCULAR; INTRAVENOUS at 13:33

## 2019-03-21 RX ADMIN — Medication 10 ML: at 13:28

## 2019-03-22 ENCOUNTER — HOSPITAL ENCOUNTER (OUTPATIENT)
Dept: INFUSION THERAPY | Age: 49
Discharge: HOME | End: 2019-03-22
Payer: COMMERCIAL

## 2019-03-22 VITALS
DIASTOLIC BLOOD PRESSURE: 71 MMHG | RESPIRATION RATE: 20 BRPM | SYSTOLIC BLOOD PRESSURE: 138 MMHG | TEMPERATURE: 98 F | HEART RATE: 89 BPM

## 2019-03-22 DIAGNOSIS — C34.11 PANCOAST TUMOR OF RIGHT LUNG (HCC): Primary | ICD-10-CM

## 2019-03-22 DIAGNOSIS — C34.10 PANCOAST TUMOR, UNSPECIFIED LATERALITY (HCC): Primary | ICD-10-CM

## 2019-03-22 PROCEDURE — 96375 TX/PRO/DX INJ NEW DRUG ADDON: CPT

## 2019-03-22 PROCEDURE — 96413 CHEMO IV INFUSION 1 HR: CPT

## 2019-03-22 PROCEDURE — 77386 HC NTSTY MODUL RAD TX DLVR CPLX: CPT

## 2019-03-22 PROCEDURE — 2580000003 HC RX 258: Performed by: INTERNAL MEDICINE

## 2019-03-22 PROCEDURE — 77336 RADIATION PHYSICS CONSULT: CPT

## 2019-03-22 PROCEDURE — 6360000002 HC RX W HCPCS: Performed by: INTERNAL MEDICINE

## 2019-03-22 RX ORDER — DEXAMETHASONE SODIUM PHOSPHATE 10 MG/ML
10 INJECTION INTRAMUSCULAR; INTRAVENOUS ONCE
Status: COMPLETED | OUTPATIENT
Start: 2019-03-22 | End: 2019-03-22

## 2019-03-22 RX ORDER — HEPARIN SODIUM (PORCINE) LOCK FLUSH IV SOLN 100 UNIT/ML 100 UNIT/ML
500 SOLUTION INTRAVENOUS PRN
Status: DISCONTINUED | OUTPATIENT
Start: 2019-03-22 | End: 2019-03-23 | Stop reason: HOSPADM

## 2019-03-22 RX ORDER — SODIUM CHLORIDE 0.9 % (FLUSH) 0.9 %
10 SYRINGE (ML) INJECTION PRN
Status: DISCONTINUED | OUTPATIENT
Start: 2019-03-22 | End: 2019-03-23 | Stop reason: HOSPADM

## 2019-03-22 RX ORDER — SODIUM CHLORIDE 9 MG/ML
250 INJECTION, SOLUTION INTRAVENOUS ONCE
Status: COMPLETED | OUTPATIENT
Start: 2019-03-22 | End: 2019-03-22

## 2019-03-22 RX ADMIN — HEPARIN 500 UNITS: 100 SYRINGE at 15:17

## 2019-03-22 RX ADMIN — Medication 10 ML: at 13:20

## 2019-03-22 RX ADMIN — Medication 10 ML: at 13:22

## 2019-03-22 RX ADMIN — DEXAMETHASONE SODIUM PHOSPHATE 10 MG: 10 INJECTION INTRAMUSCULAR; INTRAVENOUS at 13:22

## 2019-03-22 RX ADMIN — Medication 10 ML: at 15:17

## 2019-03-22 RX ADMIN — SODIUM CHLORIDE 250 ML: 9 INJECTION, SOLUTION INTRAVENOUS at 13:20

## 2019-03-22 RX ADMIN — ETOPOSIDE 120 MG: 20 INJECTION, SOLUTION, CONCENTRATE INTRAVENOUS at 13:48

## 2019-03-25 ENCOUNTER — OFFICE VISIT (OUTPATIENT)
Dept: ONCOLOGY | Age: 49
End: 2019-03-25
Payer: COMMERCIAL

## 2019-03-25 ENCOUNTER — HOSPITAL ENCOUNTER (OUTPATIENT)
Dept: INFUSION THERAPY | Age: 49
Discharge: HOME | End: 2019-03-25
Payer: COMMERCIAL

## 2019-03-25 ENCOUNTER — HOSPITAL ENCOUNTER (OUTPATIENT)
Age: 49
Discharge: HOME | End: 2019-03-25
Payer: COMMERCIAL

## 2019-03-25 VITALS
DIASTOLIC BLOOD PRESSURE: 70 MMHG | WEIGHT: 202.8 LBS | SYSTOLIC BLOOD PRESSURE: 125 MMHG | RESPIRATION RATE: 20 BRPM | HEART RATE: 82 BPM | TEMPERATURE: 97.7 F | HEIGHT: 73 IN | BODY MASS INDEX: 26.88 KG/M2

## 2019-03-25 VITALS
DIASTOLIC BLOOD PRESSURE: 77 MMHG | HEART RATE: 70 BPM | SYSTOLIC BLOOD PRESSURE: 131 MMHG | TEMPERATURE: 97.5 F | RESPIRATION RATE: 20 BRPM

## 2019-03-25 DIAGNOSIS — C34.11 PANCOAST TUMOR OF RIGHT LUNG (HCC): ICD-10-CM

## 2019-03-25 DIAGNOSIS — C34.11 PANCOAST TUMOR OF RIGHT LUNG (HCC): Primary | ICD-10-CM

## 2019-03-25 DIAGNOSIS — C34.10 PANCOAST TUMOR, UNSPECIFIED LATERALITY (HCC): ICD-10-CM

## 2019-03-25 DIAGNOSIS — C34.90 NON-SMALL CELL LUNG CANCER, UNSPECIFIED LATERALITY (HCC): Primary | ICD-10-CM

## 2019-03-25 LAB
ALBUMIN SERPL-MCNC: 4 G/DL (ref 3.5–5.2)
ALP BLD-CCNC: 70 U/L (ref 40–129)
ALT SERPL-CCNC: 53 U/L (ref 0–40)
ANION GAP SERPL CALCULATED.3IONS-SCNC: 12 MMOL/L (ref 7–16)
AST SERPL-CCNC: 26 U/L (ref 0–39)
BASOPHILS ABSOLUTE: 0.01 E9/L (ref 0–0.2)
BASOPHILS RELATIVE PERCENT: 0.1 % (ref 0–2)
BILIRUB SERPL-MCNC: 0.6 MG/DL (ref 0–1.2)
BUN BLDV-MCNC: 23 MG/DL (ref 6–20)
CALCIUM SERPL-MCNC: 9.3 MG/DL (ref 8.6–10.2)
CHLORIDE BLD-SCNC: 99 MMOL/L (ref 98–107)
CO2: 27 MMOL/L (ref 22–29)
CREAT SERPL-MCNC: 1 MG/DL (ref 0.7–1.2)
EOSINOPHILS ABSOLUTE: 0.05 E9/L (ref 0.05–0.5)
EOSINOPHILS RELATIVE PERCENT: 0.6 % (ref 0–6)
GFR AFRICAN AMERICAN: >60
GFR NON-AFRICAN AMERICAN: >60 ML/MIN/1.73
GLUCOSE BLD-MCNC: 106 MG/DL (ref 74–99)
HCT VFR BLD CALC: 40.8 % (ref 37–54)
HEMOGLOBIN: 13.7 G/DL (ref 12.5–16.5)
IMMATURE GRANULOCYTES #: 0.05 E9/L
IMMATURE GRANULOCYTES %: 0.6 % (ref 0–5)
LYMPHOCYTES ABSOLUTE: 0.97 E9/L (ref 1.5–4)
LYMPHOCYTES RELATIVE PERCENT: 11.3 % (ref 20–42)
MAGNESIUM: 2.2 MG/DL (ref 1.6–2.6)
MCH RBC QN AUTO: 27.1 PG (ref 26–35)
MCHC RBC AUTO-ENTMCNC: 33.6 % (ref 32–34.5)
MCV RBC AUTO: 80.8 FL (ref 80–99.9)
MONOCYTES ABSOLUTE: 0.08 E9/L (ref 0.1–0.95)
MONOCYTES RELATIVE PERCENT: 0.9 % (ref 2–12)
NEUTROPHILS ABSOLUTE: 7.42 E9/L (ref 1.8–7.3)
NEUTROPHILS RELATIVE PERCENT: 86.5 % (ref 43–80)
PDW BLD-RTO: 15.7 FL (ref 11.5–15)
PLATELET # BLD: 229 E9/L (ref 130–450)
PMV BLD AUTO: 9.2 FL (ref 7–12)
POTASSIUM SERPL-SCNC: 4.1 MMOL/L (ref 3.5–5)
RBC # BLD: 5.05 E12/L (ref 3.8–5.8)
SODIUM BLD-SCNC: 138 MMOL/L (ref 132–146)
TOTAL PROTEIN: 6.9 G/DL (ref 6.4–8.3)
WBC # BLD: 8.6 E9/L (ref 4.5–11.5)

## 2019-03-25 PROCEDURE — G8599 NO ASA/ANTIPLAT THER USE RNG: HCPCS | Performed by: INTERNAL MEDICINE

## 2019-03-25 PROCEDURE — 4004F PT TOBACCO SCREEN RCVD TLK: CPT | Performed by: INTERNAL MEDICINE

## 2019-03-25 PROCEDURE — 36593 DECLOT VASCULAR DEVICE: CPT

## 2019-03-25 PROCEDURE — 2580000003 HC RX 258: Performed by: INTERNAL MEDICINE

## 2019-03-25 PROCEDURE — 83735 ASSAY OF MAGNESIUM: CPT

## 2019-03-25 PROCEDURE — 6360000002 HC RX W HCPCS

## 2019-03-25 PROCEDURE — 96367 TX/PROPH/DG ADDL SEQ IV INF: CPT

## 2019-03-25 PROCEDURE — 85025 COMPLETE CBC W/AUTO DIFF WBC: CPT

## 2019-03-25 PROCEDURE — G8419 CALC BMI OUT NRM PARAM NOF/U: HCPCS | Performed by: INTERNAL MEDICINE

## 2019-03-25 PROCEDURE — 96413 CHEMO IV INFUSION 1 HR: CPT

## 2019-03-25 PROCEDURE — 96415 CHEMO IV INFUSION ADDL HR: CPT

## 2019-03-25 PROCEDURE — 77386 HC NTSTY MODUL RAD TX DLVR CPLX: CPT

## 2019-03-25 PROCEDURE — G8484 FLU IMMUNIZE NO ADMIN: HCPCS | Performed by: INTERNAL MEDICINE

## 2019-03-25 PROCEDURE — 99214 OFFICE O/P EST MOD 30 MIN: CPT | Performed by: INTERNAL MEDICINE

## 2019-03-25 PROCEDURE — 6360000002 HC RX W HCPCS: Performed by: INTERNAL MEDICINE

## 2019-03-25 PROCEDURE — 96374 THER/PROPH/DIAG INJ IV PUSH: CPT

## 2019-03-25 PROCEDURE — G8427 DOCREV CUR MEDS BY ELIG CLIN: HCPCS | Performed by: INTERNAL MEDICINE

## 2019-03-25 PROCEDURE — 96375 TX/PRO/DX INJ NEW DRUG ADDON: CPT

## 2019-03-25 PROCEDURE — 80053 COMPREHEN METABOLIC PANEL: CPT

## 2019-03-25 RX ORDER — SODIUM CHLORIDE 0.9 % (FLUSH) 0.9 %
10 SYRINGE (ML) INJECTION PRN
Status: DISCONTINUED | OUTPATIENT
Start: 2019-03-25 | End: 2019-03-26 | Stop reason: HOSPADM

## 2019-03-25 RX ORDER — SODIUM CHLORIDE 0.9 % (FLUSH) 0.9 %
10 SYRINGE (ML) INJECTION PRN
Status: CANCELLED | OUTPATIENT
Start: 2019-03-25

## 2019-03-25 RX ORDER — HEPARIN SODIUM (PORCINE) LOCK FLUSH IV SOLN 100 UNIT/ML 100 UNIT/ML
500 SOLUTION INTRAVENOUS PRN
Status: DISCONTINUED | OUTPATIENT
Start: 2019-03-25 | End: 2019-03-26 | Stop reason: HOSPADM

## 2019-03-25 RX ORDER — PALONOSETRON HYDROCHLORIDE 0.05 MG/ML
0.25 INJECTION, SOLUTION INTRAVENOUS ONCE
Status: COMPLETED | OUTPATIENT
Start: 2019-03-25 | End: 2019-03-25

## 2019-03-25 RX ORDER — DEXAMETHASONE SODIUM PHOSPHATE 10 MG/ML
10 INJECTION INTRAMUSCULAR; INTRAVENOUS ONCE
Status: COMPLETED | OUTPATIENT
Start: 2019-03-25 | End: 2019-03-25

## 2019-03-25 RX ORDER — HEPARIN SODIUM (PORCINE) LOCK FLUSH IV SOLN 100 UNIT/ML 100 UNIT/ML
500 SOLUTION INTRAVENOUS PRN
Status: CANCELLED | OUTPATIENT
Start: 2019-03-25

## 2019-03-25 RX ORDER — SODIUM CHLORIDE 9 MG/ML
250 INJECTION, SOLUTION INTRAVENOUS ONCE
Status: COMPLETED | OUTPATIENT
Start: 2019-03-25 | End: 2019-03-25

## 2019-03-25 RX ORDER — HEPARIN SODIUM (PORCINE) LOCK FLUSH IV SOLN 100 UNIT/ML 100 UNIT/ML
SOLUTION INTRAVENOUS
Status: COMPLETED
Start: 2019-03-25 | End: 2019-03-25

## 2019-03-25 RX ADMIN — Medication 10 ML: at 10:11

## 2019-03-25 RX ADMIN — Medication 10 ML: at 13:13

## 2019-03-25 RX ADMIN — Medication 10 ML: at 08:55

## 2019-03-25 RX ADMIN — PALONOSETRON 0.25 MG: 0.25 INJECTION, SOLUTION INTRAVENOUS at 10:09

## 2019-03-25 RX ADMIN — Medication 10 ML: at 09:59

## 2019-03-25 RX ADMIN — SODIUM CHLORIDE 250 ML: 9 INJECTION, SOLUTION INTRAVENOUS at 10:07

## 2019-03-25 RX ADMIN — HEPARIN SODIUM (PORCINE) LOCK FLUSH IV SOLN 100 UNIT/ML 500 UNITS: 100 SOLUTION at 13:14

## 2019-03-25 RX ADMIN — Medication 10 ML: at 08:56

## 2019-03-25 RX ADMIN — WATER 10 ML: 1 INJECTION INTRAMUSCULAR; INTRAVENOUS; SUBCUTANEOUS at 09:01

## 2019-03-25 RX ADMIN — DEXAMETHASONE SODIUM PHOSPHATE 10 MG: 10 INJECTION INTRAMUSCULAR; INTRAVENOUS at 10:11

## 2019-03-25 RX ADMIN — HEPARIN 500 UNITS: 100 SYRINGE at 13:14

## 2019-03-25 RX ADMIN — ALTEPLASE 2 MG: 2.2 INJECTION, POWDER, LYOPHILIZED, FOR SOLUTION INTRAVENOUS at 09:01

## 2019-03-25 RX ADMIN — SODIUM CHLORIDE 150 MG: 9 INJECTION, SOLUTION INTRAVENOUS at 10:19

## 2019-03-25 RX ADMIN — Medication 10 ML: at 10:53

## 2019-03-25 RX ADMIN — POTASSIUM CHLORIDE: 2 INJECTION, SOLUTION, CONCENTRATE INTRAVENOUS at 10:54

## 2019-03-25 NOTE — PROGRESS NOTES
Accessed VAD per protocol, flushes easily but blood return not present after multiple repositions. Cathflo 2mg instilled to dwell. Will recheck for blood return in 30 minutes.  Didier Zeng RN

## 2019-03-26 ENCOUNTER — OFFICE VISIT (OUTPATIENT)
Dept: PALLATIVE CARE | Age: 49
End: 2019-03-26
Payer: COMMERCIAL

## 2019-03-26 ENCOUNTER — HOSPITAL ENCOUNTER (OUTPATIENT)
Age: 49
Discharge: HOME | End: 2019-03-28
Payer: COMMERCIAL

## 2019-03-26 ENCOUNTER — TELEPHONE (OUTPATIENT)
Dept: CASE MANAGEMENT | Age: 49
End: 2019-03-26

## 2019-03-26 VITALS
OXYGEN SATURATION: 97 % | SYSTOLIC BLOOD PRESSURE: 154 MMHG | HEART RATE: 90 BPM | WEIGHT: 202 LBS | DIASTOLIC BLOOD PRESSURE: 98 MMHG | BODY MASS INDEX: 26.65 KG/M2

## 2019-03-26 DIAGNOSIS — M79.2 RADICULAR PAIN IN RIGHT ARM: ICD-10-CM

## 2019-03-26 DIAGNOSIS — R63.4 UNINTENTIONAL WEIGHT LOSS: ICD-10-CM

## 2019-03-26 DIAGNOSIS — C34.10 PANCOAST TUMOR, UNSPECIFIED LATERALITY (HCC): Primary | ICD-10-CM

## 2019-03-26 DIAGNOSIS — Z51.5 PALLIATIVE CARE BY SPECIALIST: ICD-10-CM

## 2019-03-26 DIAGNOSIS — R63.0 DECREASED APPETITE: ICD-10-CM

## 2019-03-26 LAB
AMPHETAMINE SCREEN, URINE: NOT DETECTED
BARBITURATE SCREEN URINE: NOT DETECTED
BENZODIAZEPINE SCREEN, URINE: NOT DETECTED
CANNABINOID SCREEN URINE: POSITIVE
COCAINE METABOLITE SCREEN URINE: NOT DETECTED
METHADONE SCREEN, URINE: NOT DETECTED
OPIATE SCREEN URINE: NOT DETECTED
PHENCYCLIDINE SCREEN URINE: NOT DETECTED
PROPOXYPHENE SCREEN: NOT DETECTED

## 2019-03-26 PROCEDURE — 99204 OFFICE O/P NEW MOD 45 MIN: CPT | Performed by: FAMILY MEDICINE

## 2019-03-26 PROCEDURE — G0480 DRUG TEST DEF 1-7 CLASSES: HCPCS

## 2019-03-26 PROCEDURE — G8599 NO ASA/ANTIPLAT THER USE RNG: HCPCS | Performed by: FAMILY MEDICINE

## 2019-03-26 PROCEDURE — 77386 HC NTSTY MODUL RAD TX DLVR CPLX: CPT

## 2019-03-26 PROCEDURE — 80307 DRUG TEST PRSMV CHEM ANLYZR: CPT

## 2019-03-26 PROCEDURE — G8427 DOCREV CUR MEDS BY ELIG CLIN: HCPCS | Performed by: FAMILY MEDICINE

## 2019-03-26 PROCEDURE — 99205 OFFICE O/P NEW HI 60 MIN: CPT | Performed by: FAMILY MEDICINE

## 2019-03-26 PROCEDURE — G8419 CALC BMI OUT NRM PARAM NOF/U: HCPCS | Performed by: FAMILY MEDICINE

## 2019-03-26 PROCEDURE — 4004F PT TOBACCO SCREEN RCVD TLK: CPT | Performed by: FAMILY MEDICINE

## 2019-03-26 PROCEDURE — G8484 FLU IMMUNIZE NO ADMIN: HCPCS | Performed by: FAMILY MEDICINE

## 2019-03-26 RX ORDER — GABAPENTIN 300 MG/1
300 CAPSULE ORAL EVERY 8 HOURS
Qty: 90 CAPSULE | Refills: 0 | Status: SHIPPED | OUTPATIENT
Start: 2019-03-26 | End: 2019-04-23 | Stop reason: SDUPTHER

## 2019-03-26 RX ORDER — OXYCODONE HYDROCHLORIDE AND ACETAMINOPHEN 5; 325 MG/1; MG/1
1 TABLET ORAL EVERY 6 HOURS PRN
Qty: 120 TABLET | Refills: 0 | Status: SHIPPED | OUTPATIENT
Start: 2019-03-26 | End: 2019-04-23 | Stop reason: SDUPTHER

## 2019-03-27 ENCOUNTER — HOSPITAL ENCOUNTER (OUTPATIENT)
Dept: RADIATION ONCOLOGY | Age: 49
Discharge: HOME | End: 2019-03-27
Payer: COMMERCIAL

## 2019-03-27 VITALS
HEART RATE: 84 BPM | WEIGHT: 199.1 LBS | DIASTOLIC BLOOD PRESSURE: 76 MMHG | OXYGEN SATURATION: 98 % | BODY MASS INDEX: 26.27 KG/M2 | SYSTOLIC BLOOD PRESSURE: 118 MMHG

## 2019-03-27 DIAGNOSIS — C80.1 CANCER (HCC): Primary | ICD-10-CM

## 2019-03-27 PROCEDURE — 99999 PR OFFICE/OUTPT VISIT,PROCEDURE ONLY: CPT | Performed by: RADIOLOGY

## 2019-03-30 LAB
FENTANYL URINE: <1 NG/ML
NORFENTANYL, URINE: <1 NG/ML

## 2019-03-31 LAB
6AM URINE: <10 NG/ML
7-AMINOCLONAZEPAM, URINE: <5 NG/ML
ALPHA-HYDROXYALPRAZOLAM, URINE: <5 NG/ML
ALPHA-HYDROXYMIDAZOLAM, URINE: <20 NG/ML
ALPRAZOLAM, URINE: <5 NG/ML
CHLORDIAZEPOXIDE, URINE: <20 NG/ML
CLONAZEPAM, URINE: <5 NG/ML
CODEINE, URINE: <20 NG/ML
DIAZEPAM, URINE: <20 NG/ML
HYDROCODONE, URINE: <20 NG/ML
HYDROMORPHONE, URINE: <20 NG/ML
LORAZEPAM, URINE: <20 NG/ML
MIDAZOLAM, URINE: <20 NG/ML
MORPHINE URINE: <20 NG/ML
NORDIAZEPAM, URINE: <20 NG/ML
NORHYDROCODONE, URINE: <20 NG/ML
NOROXYCODONE, URINE: <20 NG/ML
NOROXYMORPHONE, URINE: <20 NG/ML
O-DESMETHYLTRAMADOL,UR: <25 NG/ML
OXAZEPAM, URINE: <20 NG/ML
OXYCODONE, URINE CONFIRMATION: <20 NG/ML
OXYMORPHONE, URINE: <20 NG/ML
TEMAZEPAM, URINE: <20 NG/ML
TRAMADOL, URINE: <25 NG/ML

## 2019-04-01 ENCOUNTER — OFFICE VISIT (OUTPATIENT)
Dept: ONCOLOGY | Age: 49
End: 2019-04-01
Payer: COMMERCIAL

## 2019-04-01 ENCOUNTER — HOSPITAL ENCOUNTER (OUTPATIENT)
Age: 49
Discharge: HOME | End: 2019-04-01
Payer: COMMERCIAL

## 2019-04-01 ENCOUNTER — HOSPITAL ENCOUNTER (OUTPATIENT)
Dept: INFUSION THERAPY | Age: 49
Discharge: HOME | End: 2019-04-01
Payer: COMMERCIAL

## 2019-04-01 VITALS
BODY MASS INDEX: 26.69 KG/M2 | HEIGHT: 73 IN | HEART RATE: 75 BPM | SYSTOLIC BLOOD PRESSURE: 141 MMHG | WEIGHT: 201.4 LBS | TEMPERATURE: 94 F | RESPIRATION RATE: 20 BRPM | DIASTOLIC BLOOD PRESSURE: 88 MMHG

## 2019-04-01 DIAGNOSIS — Z09 FOLLOW UP: Primary | ICD-10-CM

## 2019-04-01 DIAGNOSIS — C34.11 PANCOAST TUMOR OF RIGHT LUNG (HCC): Primary | ICD-10-CM

## 2019-04-01 DIAGNOSIS — C34.11 PANCOAST TUMOR OF RIGHT LUNG (HCC): ICD-10-CM

## 2019-04-01 LAB
ALBUMIN SERPL-MCNC: 4 G/DL (ref 3.5–5.2)
ALP BLD-CCNC: 72 U/L (ref 40–129)
ALT SERPL-CCNC: 56 U/L (ref 0–40)
ANION GAP SERPL CALCULATED.3IONS-SCNC: 8 MMOL/L (ref 7–16)
AST SERPL-CCNC: 28 U/L (ref 0–39)
BASOPHILS ABSOLUTE: 0.01 E9/L (ref 0–0.2)
BASOPHILS RELATIVE PERCENT: 0.3 % (ref 0–2)
BILIRUB SERPL-MCNC: 0.3 MG/DL (ref 0–1.2)
BUN BLDV-MCNC: 8 MG/DL (ref 6–20)
CALCIUM SERPL-MCNC: 8.9 MG/DL (ref 8.6–10.2)
CANNABINOIDS CONF, URINE: >500 NG/ML
CHLORIDE BLD-SCNC: 103 MMOL/L (ref 98–107)
CO2: 28 MMOL/L (ref 22–29)
CREAT SERPL-MCNC: 0.9 MG/DL (ref 0.7–1.2)
EOSINOPHILS ABSOLUTE: 0.01 E9/L (ref 0.05–0.5)
EOSINOPHILS RELATIVE PERCENT: 0.3 % (ref 0–6)
GFR AFRICAN AMERICAN: >60
GFR NON-AFRICAN AMERICAN: >60 ML/MIN/1.73
GLUCOSE BLD-MCNC: 84 MG/DL (ref 74–99)
HCT VFR BLD CALC: 34 % (ref 37–54)
HEMOGLOBIN: 11.5 G/DL (ref 12.5–16.5)
IMMATURE GRANULOCYTES #: 0.01 E9/L
IMMATURE GRANULOCYTES %: 0.3 % (ref 0–5)
LYMPHOCYTES ABSOLUTE: 0.81 E9/L (ref 1.5–4)
LYMPHOCYTES RELATIVE PERCENT: 23.1 % (ref 20–42)
MAGNESIUM: 1.9 MG/DL (ref 1.6–2.6)
MCH RBC QN AUTO: 27.2 PG (ref 26–35)
MCHC RBC AUTO-ENTMCNC: 33.8 % (ref 32–34.5)
MCV RBC AUTO: 80.4 FL (ref 80–99.9)
MONOCYTES ABSOLUTE: 0.36 E9/L (ref 0.1–0.95)
MONOCYTES RELATIVE PERCENT: 10.3 % (ref 2–12)
NEUTROPHILS ABSOLUTE: 2.31 E9/L (ref 1.8–7.3)
NEUTROPHILS RELATIVE PERCENT: 65.7 % (ref 43–80)
PDW BLD-RTO: 15.4 FL (ref 11.5–15)
PLATELET # BLD: 103 E9/L (ref 130–450)
PMV BLD AUTO: 10.5 FL (ref 7–12)
POTASSIUM SERPL-SCNC: 4.1 MMOL/L (ref 3.5–5)
RBC # BLD: 4.23 E12/L (ref 3.8–5.8)
SODIUM BLD-SCNC: 139 MMOL/L (ref 132–146)
TOTAL PROTEIN: 6.6 G/DL (ref 6.4–8.3)
WBC # BLD: 3.5 E9/L (ref 4.5–11.5)

## 2019-04-01 PROCEDURE — 99214 OFFICE O/P EST MOD 30 MIN: CPT

## 2019-04-01 PROCEDURE — G8427 DOCREV CUR MEDS BY ELIG CLIN: HCPCS | Performed by: INTERNAL MEDICINE

## 2019-04-01 PROCEDURE — G8419 CALC BMI OUT NRM PARAM NOF/U: HCPCS | Performed by: INTERNAL MEDICINE

## 2019-04-01 PROCEDURE — 99214 OFFICE O/P EST MOD 30 MIN: CPT | Performed by: INTERNAL MEDICINE

## 2019-04-01 PROCEDURE — 36591 DRAW BLOOD OFF VENOUS DEVICE: CPT

## 2019-04-01 PROCEDURE — 4004F PT TOBACCO SCREEN RCVD TLK: CPT | Performed by: INTERNAL MEDICINE

## 2019-04-01 PROCEDURE — 6360000002 HC RX W HCPCS: Performed by: INTERNAL MEDICINE

## 2019-04-01 PROCEDURE — 83735 ASSAY OF MAGNESIUM: CPT

## 2019-04-01 PROCEDURE — 85025 COMPLETE CBC W/AUTO DIFF WBC: CPT

## 2019-04-01 PROCEDURE — G8599 NO ASA/ANTIPLAT THER USE RNG: HCPCS | Performed by: INTERNAL MEDICINE

## 2019-04-01 PROCEDURE — 80053 COMPREHEN METABOLIC PANEL: CPT

## 2019-04-01 PROCEDURE — 2580000003 HC RX 258: Performed by: INTERNAL MEDICINE

## 2019-04-01 RX ORDER — SODIUM CHLORIDE 0.9 % (FLUSH) 0.9 %
10 SYRINGE (ML) INJECTION PRN
Status: CANCELLED | OUTPATIENT
Start: 2019-04-01

## 2019-04-01 RX ORDER — SODIUM CHLORIDE 0.9 % (FLUSH) 0.9 %
10 SYRINGE (ML) INJECTION PRN
Status: DISCONTINUED | OUTPATIENT
Start: 2019-04-01 | End: 2019-04-02 | Stop reason: HOSPADM

## 2019-04-01 RX ORDER — HEPARIN SODIUM (PORCINE) LOCK FLUSH IV SOLN 100 UNIT/ML 100 UNIT/ML
500 SOLUTION INTRAVENOUS PRN
Status: DISCONTINUED | OUTPATIENT
Start: 2019-04-01 | End: 2019-04-02 | Stop reason: HOSPADM

## 2019-04-01 RX ORDER — HEPARIN SODIUM (PORCINE) LOCK FLUSH IV SOLN 100 UNIT/ML 100 UNIT/ML
500 SOLUTION INTRAVENOUS PRN
Status: CANCELLED | OUTPATIENT
Start: 2019-04-01

## 2019-04-01 RX ADMIN — Medication 10 ML: at 08:35

## 2019-04-01 RX ADMIN — Medication 10 ML: at 08:40

## 2019-04-01 RX ADMIN — HEPARIN 500 UNITS: 100 SYRINGE at 08:41

## 2019-04-01 NOTE — PROGRESS NOTES
Department of [facility] Oncology  Attending Clinic Note    Reason for Visit: Follow-up on a patient with Pancoast Tumor    PCP:  Woody Wagner DO    History of Present Illness:  52 y/o male who was c/o right sided chest pain that radiates to the right shoulder and back. CT chest 12/19/2018:  3.5 x 3.7 x 3 cm mass in the right upper lobe/apex with chest wall invasion concerning for malignancy like a Pancoast tumor. Small nonspecific mediastinal LN noted. MRI Right brachial plexus on 01/07/2019:  A 4 x 4 by 4 cm circumscribed Pancoast's type mass is identified in the right pulmonary apical region posterior medial in position, and extends through the first and second ribs into the overlying soft tissues just inferior and posterior to the brachial plexus structures and may involve second rib marrow. CT guided Core Biopsy Right apical lung mass was performed on 01/16/2019:  Poorly differentiated non-small cell carcinoma, see comment. Comment: The core biopsies consist of a very limited amount of largely necrotic tumor tissue encased in sclerotic stroma.  Definitive glandular or squamous differentiation is not observed.  By immunostain, the tumor cells are diffusely positive for cytokeratin 7 and rare cells demonstrate nuclear P40.  Immunostains are negative for TTF-1, cytokeratin 20 and cytokeratin 5/6.  This pattern does not definitively distinguish cell  type but is suggestive of a squamous component. PET/CT scan on 01/22/2019:  Increased uptake seen in the known 3.7 cm greatest diameter neoplasm located posteriorly against the pleura in the apical segment of the right upper lobe; SUV reaches a level a level as high as 25  Focus of increased activity along the anterior costochondral junction region of the left seventh rib is noted, most compatible with healing trauma. No evidence of mediastinal, hilar or distant metastasis. MRI Brain 02/03/2019 No evidence of brain metastasis.  Punctate extra-axial left frontal nodule, likely representing a calcified meningioma as a small calcification is identified within this region on the patient's prior CT performed on 12/12/2006. For his stage III NSCLC; Superior Sulcus Tumor; he was recommended to proceed with concurrent chemoradiation with cisplatin/etoposide; followed by surgical resection (3-5 weeks after completion of induction chemoRT); followed by 2 cycles of platinum-based therapy. Side effects of Cisplatin + Etoposide reviewed with patient. He agreed to proceed. Chemotherapy teaching session was provided. Mediport was placed. Patient met with [facility] cardiothoracic surgery team (Dr. Keturah Velazquez) and is a candidate for thoracic surgery 3-5 weeks after completion of concurrent chemoRT. Cycle # 1 Cisplatin + Etoposide with concurrent radiation was started on 02/18/2019 and completed 03/27/2019. Review of Systems;  CONSTITUTIONAL: No fever, chills. Fair appetite and energy level. ENMT: Eyes: No diplopia; Nose: No epistaxis. Mouth: No sore throat. RESPIRATORY: No hemoptysis, shortness of breath, cough. CARDIOVASCULAR: No palpitations. Right sided chest pain/shoulder and back pain improved. GASTROINTESTINAL: No nausea/vomiting, abdominal pain, diarrhea/constipation. GENITOURINARY: No dysuria, urinary frequency, hematuria. NEURO: No syncope, presyncope, headache. Remainder:  ROS NEGATIVE    Past Medical History:      Diagnosis Date    Cancer ([facility] [address] 11/2018    lung ca right upper lung panko     Medications:  Reviewed and reconciled. Allergies:  No Known Allergies    Physical Exam:  BP (!) 141/88 (Site: Right Upper Arm, Position: Sitting, Cuff Size: Medium Adult)   Pulse 75   Temp 94 °F (34.4 °C) (Temporal)   Resp 20   Ht 6' 1\" (1.854 m)   Wt 201 lb 6.4 oz (91.4 kg)   BMI 26.57 kg/m²   GENERAL: Alert, oriented x 3, not in acute distress. HEENT: PERRLA; EOMI. Oropharynx clear. NECK: Supple. Without lymphadenopathy.    LUNGS: Good air entry bilaterally. No wheezing, crackles or ronchi. CARDIOVASCULAR: Regular rate. No murmurs, rubs or gallops. ABDOMEN: Soft. Non-tender, non-distended. Positive bowel sounds. EXTREMITIES: Without clubbing, cyanosis, or edema. NEUROLOGIC: No focal deficits. ECOG PS 1    Impression/Plan:  52 y/o male with hx of smoking (1 pack/day till present) found to have RUL Pancoast tumor    CT chest 12/19/2018:  3.5 x 3.7 x 3 cm mass in the right upper lobe/apex with chest wall invasion concerning for malignancy like a Pancoast tumor. Small nonspecific mediastinal LN noted. MRI Right brachial plexus on 01/07/2019:  A 4 x 4 by 4 cm circumscribed Pancoast's type mass is identified in the right pulmonary apical region posterior medial in position, and extends through the first and second ribs into the overlying soft tissues just inferior and posterior to the brachial plexus structures and may involve second rib marrow. CT guided Core Biopsy Right apical lung mass was performed on 01/16/2019:  Poorly differentiated non-small cell carcinoma  Comment:    The core biopsies consist of a very limited amount of largely necrotic tumor tissue encased in sclerotic stroma.  Definitive glandular or squamous differentiation is not observed.  By immunostain, the tumor cells are diffusely positive for cytokeratin 7 and rare cells demonstrate nuclear P40.  Immunostains are negative for TTF-1, cytokeratin 20 and cytokeratin 5/6.  This pattern does not definitively distinguish cell type but is suggestive of a squamous component. PET/CT scan on 01/22/2019:  Increased uptake seen in the known 3.7 cm greatest diameter neoplasm located posteriorly against the pleura in the apical segment of the right upper lobe; SUV reaches a level a level as high as 25  Focus of increased activity along the anterior costochondral junction region of the left seventh rib is noted, most compatible with healing trauma.   No evidence of mediastinal, hilar or distant metastasis. MRI Brain 02/03/2019 No evidence of brain metastasis. Punctate extra-axial left frontal nodule, likely representing a calcified meningioma as a small calcification is identified within this region on the patient's prior CT performed on 12/12/2006. For his stage III NSCLC; Superior Sulcus Tumor; he was recommended to proceed with concurrent chemoradiation with cisplatin/etoposide; followed by surgical resection (3-5 weeks after completion of induction chemoRT); followed by 2 cycles of platinum-based therapy. Side effects of Cisplatin + Etoposide reviewed with patient. He agreed to proceed. Chemotherapy teaching session was provided. Mediport was placed. Patient met with [facility] cardiothoracic surgery team (Dr. Chucho Cortez) and is a candidate for thoracic surgery 3-5 weeks after completion of concurrent chemoRT. Cycle # 1 Cisplatin + Etoposide with concurrent radiation was started on 02/18/2019 and completed on 03/27/2019. Return to see Dr. Chucho Cortez at [facility] for surgical evaluation.     RTC 05/13/2019     Juan Skelton MD   [id number]  Board Certified Medical Oncologist

## 2019-04-01 NOTE — PROGRESS NOTES
Port accessed for blood drawn. Good blood return noted and port flushed per protocol and deaccessed. Lab work sent to lab and patient to office to see doctor.

## 2019-04-23 ENCOUNTER — HOSPITAL ENCOUNTER (OUTPATIENT)
Age: 49
Discharge: HOME | End: 2019-04-25
Payer: COMMERCIAL

## 2019-04-23 ENCOUNTER — OFFICE VISIT (OUTPATIENT)
Dept: PALLATIVE CARE | Age: 49
End: 2019-04-23
Payer: COMMERCIAL

## 2019-04-23 VITALS
HEART RATE: 85 BPM | DIASTOLIC BLOOD PRESSURE: 89 MMHG | OXYGEN SATURATION: 97 % | WEIGHT: 205 LBS | SYSTOLIC BLOOD PRESSURE: 130 MMHG | BODY MASS INDEX: 27.05 KG/M2

## 2019-04-23 DIAGNOSIS — Z51.5 PALLIATIVE CARE BY SPECIALIST: ICD-10-CM

## 2019-04-23 DIAGNOSIS — R63.0 DECREASED APPETITE: ICD-10-CM

## 2019-04-23 DIAGNOSIS — R63.4 UNINTENTIONAL WEIGHT LOSS: ICD-10-CM

## 2019-04-23 DIAGNOSIS — C34.10 PANCOAST TUMOR, UNSPECIFIED LATERALITY (HCC): ICD-10-CM

## 2019-04-23 DIAGNOSIS — C34.10 PANCOAST TUMOR, UNSPECIFIED LATERALITY (HCC): Primary | ICD-10-CM

## 2019-04-23 DIAGNOSIS — G89.3 PAIN, NEOPLASM-RELATED: ICD-10-CM

## 2019-04-23 DIAGNOSIS — Z51.5 PALLIATIVE CARE BY SPECIALIST: Primary | ICD-10-CM

## 2019-04-23 DIAGNOSIS — M79.2 RADICULAR PAIN IN RIGHT ARM: ICD-10-CM

## 2019-04-23 PROCEDURE — G0480 DRUG TEST DEF 1-7 CLASSES: HCPCS

## 2019-04-23 PROCEDURE — 80307 DRUG TEST PRSMV CHEM ANLYZR: CPT

## 2019-04-23 PROCEDURE — G8419 CALC BMI OUT NRM PARAM NOF/U: HCPCS | Performed by: FAMILY MEDICINE

## 2019-04-23 PROCEDURE — G8428 CUR MEDS NOT DOCUMENT: HCPCS | Performed by: FAMILY MEDICINE

## 2019-04-23 PROCEDURE — G8599 NO ASA/ANTIPLAT THER USE RNG: HCPCS | Performed by: FAMILY MEDICINE

## 2019-04-23 PROCEDURE — 4004F PT TOBACCO SCREEN RCVD TLK: CPT | Performed by: FAMILY MEDICINE

## 2019-04-23 PROCEDURE — 99212 OFFICE O/P EST SF 10 MIN: CPT

## 2019-04-23 PROCEDURE — 99214 OFFICE O/P EST MOD 30 MIN: CPT | Performed by: FAMILY MEDICINE

## 2019-04-23 RX ORDER — OXYCODONE HYDROCHLORIDE AND ACETAMINOPHEN 5; 325 MG/1; MG/1
1 TABLET ORAL EVERY 6 HOURS PRN
Qty: 120 TABLET | Refills: 0 | Status: SHIPPED | OUTPATIENT
Start: 2019-04-23 | End: 2019-05-23

## 2019-04-23 RX ORDER — GABAPENTIN 300 MG/1
300 CAPSULE ORAL EVERY 8 HOURS
Qty: 90 CAPSULE | Refills: 0 | Status: SHIPPED | OUTPATIENT
Start: 2019-04-23 | End: 2019-05-23

## 2019-04-23 NOTE — PROGRESS NOTES
Palliative Medicine Outpatient Visit  2019    Provider: Gonzalo Villafana MD        Referring Provider: Radiation oncology    Reason for Consult:  [] Advanced Care Planning  [x] Assist with goals of care  [] Transition of care  [x] Symptom Management    See below for recommendations, as indicated, concernin. Advanced Care Planning  2. Anticipatory Guidance  3. Transition of Care  4. Symptom Management      CC: Right chest/right arm pain, decreased appetite, unintentional weight loss    HPI:  As per medical oncology assessment on 19:  54 y/o male with hx of smoking (1 pack/day till present) found to have RUL Pancoast tumor  CT chest 2018:  3.5 x 3.7 x 3 cm mass in the right upper lobe/apex with chest wall invasion concerning for malignancy like a Pancoast tumor. Small nonspecific mediastinal LN noted. MRI Right brachial plexus on 2019:  A 4 x 4 by 4 cm circumscribed Pancoast's type mass is identified in the right pulmonary apical region posterior medial in position, and extends through the first and second ribs into the overlying soft tissues just inferior and posterior to the brachial plexus structures and may involve second rib marrow. CT guided Core Biopsy Right apical lung mass was performed on 2019:  Poorly differentiated non-small cell carcinoma  Comment:    The core biopsies consist of a very limited amount of largely necrotic tumor tissue encased in sclerotic stroma.  Definitive glandular or squamous differentiation is not observed.  By immunostain, the tumor cells are diffusely positive for cytokeratin 7 and rare cells demonstrate nuclear P40.  Immunostains are negative for TTF-1, cytokeratin 20 and cytokeratin 5/6.  This pattern does not definitively distinguish cell type but is suggestive of a squamous component.   PET/CT scan on 2019:  Increased uptake seen in the known 3.7 cm greatest diameter neoplasm located posteriorly against the pleura in the apical segment of the right upper lobe; SUV reaches a level a level as high as 25  Focus of increased activity along the anterior costochondral junction region of the left seventh rib is noted, most compatible with healing trauma. No evidence of mediastinal, hilar or distant metastasis. MRI Brain 02/03/2019 No evidence of brain metastasis. Punctate extra-axial left frontal nodule, likely representing a calcified meningioma as a small calcification is identified within this region on the patient's prior CT performed on 12/12/2006. For his stage III NSCLC; Superior Sulcus Tumor; he was recommended to proceed with concurrent chemoradiation with cisplatin/etoposide; followed by surgical resection (3-5 weeks after completion of induction chemoRT); followed by 2 cycles of platinum-based therapy. Side effects of Cisplatin + Etoposide reviewed with patient. He agreed to proceed. Chemotherapy teaching session was provided. Mediport was placed. Patient met with [facility] cardiothoracic surgery team (Dr. Abdirahman Spears) and is a candidate for thoracic surgery 3-5 weeks after completion of concurrent chemoRT. Cycle # 1 Cisplatin + Etoposide with concurrent radiation was started on 02/18/2019 which he is tolerating well so far. Right sided chest pain/shoulder and back pain improved. Continue chemoRT per protocol. RTC next week with labs. Past Medical History:   Diagnosis Date    Cancer ([facility] [address] 11/2018    lung ca right upper lung panko       Past Surgical History:   Procedure Laterality Date    INSERTION / REMOVAL / REPLACEMENT VENOUS ACCESS CATHETER Right 1/29/2019    MEDI PORT INSERTION performed by Maxx Webb MD at [address] [place] [address]  01/2019       Current Outpatient Medications on File Prior to Visit   Medication Sig Dispense Refill    oxyCODONE-acetaminophen (PERCOCET) 5-325 MG per tablet Take 1 tablet by mouth every 6 hours as needed for Pain (hold for sedation) for up to 30 days.  120 tablet 0    gabapentin (NEURONTIN) 300 MG capsule Take 1 capsule by mouth every 8 hours for 30 days. 90 capsule 0    oxyCODONE-acetaminophen (PERCOCET) 5-325 MG per tablet Take 1 tablet by mouth 3 times daily as needed for Pain. .      ondansetron (ZOFRAN) 8 MG tablet Take 0.5-1 tablets by mouth every 8 hours as needed for Nausea or Vomiting 60 tablet 1    lidocaine-prilocaine (EMLA) 2.5-2.5 % cream Apply 1 hour before your scheduled chemotherapy,cream will last up to 3 hours. Squeeze a small amount,the size of a quarter onto your port. 30 g 5    prochlorperazine (COMPAZINE) 10 MG tablet Take 1 tablet by mouth every 6 hours as needed (nausea) 60 tablet 1    ibuprofen (ADVIL;MOTRIN) 200 MG tablet Take 200 mg by mouth every 6 hours as needed for Pain Ld 1-24-19       No current facility-administered medications on file prior to visit. Allergies:    Patient has no known allergies. ROS: UNLESS STATED ABOVE PATIENT DENIES:  CONSTITUTIONAL:  fever, chill, rigors, nausea, vomiting, fatigue. HEENT: blurry vision, double vision, hearing problem, tinnitus, hoarseness, dysphagia,               odynophagia  RESPIRATORY: cough, shortness of breath, sputum expectoration. CARDIOVASCULAR:  Chest pain/pressure, palpitation, syncope, irregular beats  GASTROINTESTINAL:  abdominal or rectal pain, diarrhea, constipation, . GENITOURINARY:  Burning, frequency, urgency, incontinence, discharge  INTEGUMENTARY: rash, wound, pruritis  HEMATOLOGIC/LYMPHATIC:  Swelling, sores, gum bleeding, easy bruising, pica.   MUSCULOSKELETAL:  pain, edema, joint swelling or redness  NEUROLOGICAL:  light headed, dizziness, loss of consciousness, weakness, change                                   in memory, seizures, tremors    Social history:  Social History     Socioeconomic History    Marital status: Single     Spouse name: Not on file    Number of children: Not on file    Years of education: Not on file    Highest education level: Not on file without acute lesions  Neuro: awake, alert, oriented x 3, conversive,     Impression:  As per medical oncology assessment on 03/25/19:  52 y/o male with hx of smoking (1 pack/day till present) found to have RUL Pancoast tumor  CT chest 12/19/2018:  3.5 x 3.7 x 3 cm mass in the right upper lobe/apex with chest wall invasion concerning for malignancy like a Pancoast tumor. Small nonspecific mediastinal LN noted. MRI Right brachial plexus on 01/07/2019:  A 4 x 4 by 4 cm circumscribed Pancoast's type mass is identified in the right pulmonary apical region posterior medial in position, and extends through the first and second ribs into the overlying soft tissues just inferior and posterior to the brachial plexus structures and may involve second rib marrow. CT guided Core Biopsy Right apical lung mass was performed on 01/16/2019:  Poorly differentiated non-small cell carcinoma  Comment:    The core biopsies consist of a very limited amount of largely necrotic tumor tissue encased in sclerotic stroma.  Definitive glandular or squamous differentiation is not observed.  By immunostain, the tumor cells are diffusely positive for cytokeratin 7 and rare cells demonstrate nuclear P40.  Immunostains are negative for TTF-1, cytokeratin 20 and cytokeratin 5/6.  This pattern does not definitively distinguish cell type but is suggestive of a squamous component. PET/CT scan on 01/22/2019:  Increased uptake seen in the known 3.7 cm greatest diameter neoplasm located posteriorly against the pleura in the apical segment of the right upper lobe; SUV reaches a level a level as high as 25  Focus of increased activity along the anterior costochondral junction region of the left seventh rib is noted, most compatible with healing trauma. No evidence of mediastinal, hilar or distant metastasis. MRI Brain 02/03/2019 No evidence of brain metastasis.  Punctate extra-axial left frontal nodule, likely representing a calcified meningioma as a small calcification is identified within this region on the patient's prior CT performed on 12/12/2006. For his stage III NSCLC; Superior Sulcus Tumor; he was recommended to proceed with concurrent chemoradiation with cisplatin/etoposide; followed by surgical resection (3-5 weeks after completion of induction chemoRT); followed by 2 cycles of platinum-based therapy. Side effects of Cisplatin + Etoposide reviewed with patient. He agreed to proceed. Chemotherapy teaching session was provided. Mediport was placed. Patient met with [facility] cardiothoracic surgery team (Dr. Marcia Rice) and is a candidate for thoracic surgery 3-5 weeks after completion of concurrent chemoRT. Cycle # 1 Cisplatin + Etoposide with concurrent radiation was started on 02/18/2019 which he is tolerating well so far. Right sided chest pain/shoulder and back pain improved. Continue chemoRT per protocol. RTC next week with labs.   03/26/19:  Medical records reviewed and patient presents with his fiancée ambulatory no acute distress without sign of sedation and/or confusion able to voices needs with slight pressured speech. Patient introduced to palliative care, signed an opiate agreement and we are sending a UDS. Patient adamantly denies street drug use and states that on Saturday he tried CBD oil once for the pain which did not help. Patient states he plans on not using this anymore. Patient states his 1 symptom is pain to his right chest radiating into his right arm worse when his arm is dependent. Patient states he ran out of Percocet Friday in which he was taken twice daily. Patient states his pain is worse in the morning and the Percocet would help for several hours. Patient would take 800 mg of ibuprofen and Tylenol in the afternoon which helps minimally with his pain. Patient states that tomorrow he is finishing up his first cycle of radiation prior to his surgery.   Patient has noticed decreased appetite secondary to dysgeusia since radiation therapy. Patient does have ensure that this helped stabilize his weight. Patient has lost 6 pounds as per our record. Patient denies using anything else for his pain and uses Zofran in the morning which helps with his nausea. Patient denies other new symptoms which are uncontrolled. Patient's blood pressure elevated today as per Epic and he states that usually it is normal.  Options were discussed and I discussed a palliative approach to symptom management. UDS pending and we are prescribing Percocet 5-325 milligram tablets 1 tablet q.6 hours p.r.n. holding for sedation prescribing 120 tablets today. Advised patient not to take Tylenol with the above and they voiced understanding. Patient states that he would probably stop the ibuprofen as well since it was not significantly helping. NSAID may be indicated in future. Today we are initiating Neurontin 300 mg q.h.s. ×4 days then b.i.d. ×4 days then t.i.d. and less sedation prevents daytime dosing. Patient given our phone number and advised to call us immediately if he experiences any problems or sedation from the medication and they voiced understanding stating that they would. Other options would be Cymbalta and titration of the above. We will see him back in 4 weeks or sooner if he needs us. 04/23/19:  Medical records reviewed and as per medical oncology assessment from 04/01/19:  For his stage III NSCLC; Superior Sulcus Tumor; he was recommended to proceed with concurrent chemoradiation with cisplatin/etoposide; followed by surgical resection (3-5 weeks after completion of induction chemoRT); followed by 2 cycles of platinum-based therapy. Side effects of Cisplatin + Etoposide reviewed with patient. He agreed to proceed. Chemotherapy teaching session was provided. Mediport was placed.   Patient met with [facility] cardiothoracic surgery team (Dr. Reggie Flores) and is a candidate for thoracic surgery 3-5 weeks after completion of concurrent chemoRT. Cycle # 1 Cisplatin + Etoposide with concurrent radiation was started on 02/18/2019 and completed on 03/27/2019. Return to see Dr. Oswaldo Ruby at [facility] for surgical evaluation. RTC 05/13/2019   Patient presents today accompanied by now his wife after they were  in [facility] since we last saw him. Patient weight has remained the same and he states that he no longer use the Ensure and tries to eat well. Patient is awaiting word on surgery from [facility]. Patient states his symptoms are much better controlled on the Percocet 3-4 times daily stating that he has approximately 10-12 tablets remaining. I asked him to bring in the bottles with him on subsequent visits and they voiced understanding. Patient also compliant with Neurontin 300 mg t.i.d. Patient states that he still has pain in his chest and down his arm but significantly improved desiring not to change anything at this time. Options were discussed and we are repeating the UDS today and will prescribe Percocet accordingly. Patient was also continue his Neurontin 300 mg t.i.d.  We'll see him back in 4 weeks or sooner if he needs us.     Time in minutes:    [] 15   [] 30   [] 45   [x] 60   [] 75   [] 90  Chart review/documentation:  [x] 15   [] 30   [] 45   [] 60   [] 75   [] 90  Assessment:     [x] 15   [] 30   [] 45   [] 60   [] 75   [] 90  Conversation patient/family/staff: [] 15   [x] 30   [] 45   [] 60   [] 75   [] 90    Larry Hough MD    4/23/2019

## 2019-04-24 ENCOUNTER — TELEPHONE (OUTPATIENT)
Dept: RADIATION ONCOLOGY | Age: 49
End: 2019-04-24

## 2019-04-24 NOTE — TELEPHONE ENCOUNTER
Patient called related to possible scheduled surgery date at [facility]. Per patient's mother, surgery has not been scheduled, patient is awaitying call from [facility]. Nursing did suggest that patient call surgeon's office to let them know he has finished his first course of treatment and is waiting for a scheduled surgery date. Patient will call otr office with date of surgery when available.

## 2019-04-26 LAB — CANNABINOIDS CONF, URINE: >500 NG/ML

## 2019-04-27 LAB
6AM URINE: <10 NG/ML
CODEINE, URINE: <20 NG/ML
HYDROCODONE, URINE: <20 NG/ML
HYDROMORPHONE, URINE: <20 NG/ML
MORPHINE URINE: <20 NG/ML
NORHYDROCODONE, URINE: <20 NG/ML
NOROXYCODONE, URINE: 301 NG/ML
NOROXYMORPHONE, URINE: 59 NG/ML
OXYCODONE, URINE CONFIRMATION: 66 NG/ML
OXYMORPHONE, URINE: <20 NG/ML

## 2019-04-29 ENCOUNTER — TELEPHONE (OUTPATIENT)
Dept: PALLATIVE CARE | Age: 49
End: 2019-04-29

## 2019-04-29 NOTE — TELEPHONE ENCOUNTER
Call to [facility] regarding his UDS results of cannabinoid >500 ng/ml. Informed [facility] that Dr. Lupe Sahu will not write any further scripts for controlled substances as he continues to use Marijuana. [facility] voices understanding.

## 2019-05-07 ENCOUNTER — TELEPHONE (OUTPATIENT)
Dept: CASE MANAGEMENT | Age: 49
End: 2019-05-07

## 2019-05-07 NOTE — TELEPHONE ENCOUNTER
Called and left a message for patient to check status of surgery appointment. Contacted Dr Stefanie Mcardle office and patient is scheduled for his surgery on June 5, 2019. The office staff states patient has been updated and has the appointment information. Will updated Dany Yeboah RN with Dr Lion Marie.

## 2019-05-13 ENCOUNTER — HOSPITAL ENCOUNTER (OUTPATIENT)
Dept: INFUSION THERAPY | Age: 49
End: 2019-05-13

## 2019-06-17 ENCOUNTER — APPOINTMENT (OUTPATIENT)
Dept: GENERAL RADIOLOGY | Age: 49
End: 2019-06-17
Payer: COMMERCIAL

## 2019-06-17 ENCOUNTER — APPOINTMENT (OUTPATIENT)
Dept: CT IMAGING | Age: 49
End: 2019-06-17
Payer: COMMERCIAL

## 2019-06-17 ENCOUNTER — HOSPITAL ENCOUNTER (EMERGENCY)
Age: 49
Discharge: ANOTHER HOSPITAL | End: 2019-06-17
Attending: EMERGENCY MEDICINE
Payer: COMMERCIAL

## 2019-06-17 VITALS
HEIGHT: 73 IN | WEIGHT: 205 LBS | RESPIRATION RATE: 24 BRPM | OXYGEN SATURATION: 91 % | BODY MASS INDEX: 27.17 KG/M2 | SYSTOLIC BLOOD PRESSURE: 75 MMHG | HEART RATE: 114 BPM | DIASTOLIC BLOOD PRESSURE: 35 MMHG | TEMPERATURE: 90.2 F

## 2019-06-17 DIAGNOSIS — J94.2 HEMOPNEUMOTHORAX ON RIGHT: ICD-10-CM

## 2019-06-17 DIAGNOSIS — E87.5 HYPERKALEMIA: ICD-10-CM

## 2019-06-17 DIAGNOSIS — E86.1 HYPOVOLEMIA: ICD-10-CM

## 2019-06-17 DIAGNOSIS — I95.9 HYPOTENSION, UNSPECIFIED HYPOTENSION TYPE: ICD-10-CM

## 2019-06-17 DIAGNOSIS — I46.9 CARDIAC ARREST (HCC): Primary | ICD-10-CM

## 2019-06-17 LAB
AADO2: 347.6 MMHG
AADO2: 422.7 MMHG
ABO/RH: NORMAL
ABO/RH: NORMAL
ALBUMIN SERPL-MCNC: 1.9 G/DL (ref 3.5–5.2)
ALBUMIN SERPL-MCNC: 2 G/DL (ref 3.5–5.2)
ALP BLD-CCNC: 72 U/L (ref 40–129)
ALP BLD-CCNC: 89 U/L (ref 40–129)
ALT SERPL-CCNC: 251 U/L (ref 0–40)
ALT SERPL-CCNC: 503 U/L (ref 0–40)
ANION GAP SERPL CALCULATED.3IONS-SCNC: 33 MMOL/L (ref 7–16)
ANION GAP SERPL CALCULATED.3IONS-SCNC: 36 MMOL/L (ref 7–16)
ANISOCYTOSIS: ABNORMAL
ANISOCYTOSIS: ABNORMAL
ANTIBODY SCREEN: NORMAL
AST SERPL-CCNC: 252 U/L (ref 0–39)
AST SERPL-CCNC: 511 U/L (ref 0–39)
B.E.: -15.3 MMOL/L (ref -3–0)
B.E.: -16 MMOL/L (ref -3–0)
B.E.: -17 MMOL/L (ref -3–3)
B.E.: -25.1 MMOL/L (ref -3–3)
BASOPHILIC STIPPLING: ABNORMAL
BASOPHILS ABSOLUTE: 0.09 E9/L (ref 0–0.2)
BASOPHILS ABSOLUTE: 0.42 E9/L (ref 0–0.2)
BASOPHILS RELATIVE PERCENT: 0.9 % (ref 0–2)
BASOPHILS RELATIVE PERCENT: 1.7 % (ref 0–2)
BILIRUB SERPL-MCNC: 0.3 MG/DL (ref 0–1.2)
BILIRUB SERPL-MCNC: <0.2 MG/DL (ref 0–1.2)
BLOOD BANK DISPENSE STATUS: NORMAL
BLOOD BANK PRODUCT CODE: NORMAL
BPU ID: NORMAL
BUN BLDV-MCNC: 14 MG/DL (ref 6–20)
BUN BLDV-MCNC: 16 MG/DL (ref 6–20)
BURR CELLS: ABNORMAL
BURR CELLS: ABNORMAL
CALCIUM SERPL-MCNC: 8 MG/DL (ref 8.6–10.2)
CALCIUM SERPL-MCNC: 9.7 MG/DL (ref 8.6–10.2)
CHLORIDE BLD-SCNC: 103 MMOL/L (ref 98–107)
CHLORIDE BLD-SCNC: 107 MMOL/L (ref 98–107)
CO2: 10 MMOL/L (ref 22–29)
CO2: 15 MMOL/L (ref 22–29)
COHB: 1.2 % (ref 0–1.5)
COHB: 1.3 % (ref 0–1.5)
CREAT SERPL-MCNC: 1.4 MG/DL (ref 0.7–1.2)
CREAT SERPL-MCNC: 1.7 MG/DL (ref 0.7–1.2)
CRITICAL NOTIFICATION: YES
CRITICAL NOTIFICATION: YES
CRITICAL: ABNORMAL
CRITICAL: ABNORMAL
DATE ANALYZED: ABNORMAL
DATE ANALYZED: ABNORMAL
DATE OF COLLECTION: ABNORMAL
DATE OF COLLECTION: ABNORMAL
DELIVERY SYSTEMS: ABNORMAL
DELIVERY SYSTEMS: ABNORMAL
DESCRIPTION BLOOD BANK: NORMAL
DEVICE: ABNORMAL
DEVICE: ABNORMAL
DOHLE BODIES: ABNORMAL
DOHLE BODIES: ABNORMAL
EOSINOPHILS ABSOLUTE: 0 E9/L (ref 0.05–0.5)
EOSINOPHILS ABSOLUTE: 0.26 E9/L (ref 0.05–0.5)
EOSINOPHILS RELATIVE PERCENT: 0 % (ref 0–6)
EOSINOPHILS RELATIVE PERCENT: 2.6 % (ref 0–6)
FIO2 ARTERIAL: 100
FIO2 ARTERIAL: 15
FIO2: 100 %
FIO2: 100 %
GFR AFRICAN AMERICAN: 52
GFR AFRICAN AMERICAN: >60
GFR NON-AFRICAN AMERICAN: 52 ML/MIN/1.73
GFR NON-AFRICAN AMERICAN: >60 ML/MIN/1.73
GLUCOSE BLD-MCNC: 240 MG/DL (ref 74–99)
GLUCOSE BLD-MCNC: 319 MG/DL (ref 74–99)
HCO3 ARTERIAL: 13.1 MMOL/L (ref 22–26)
HCO3 ARTERIAL: 13.4 MMOL/L (ref 22–26)
HCO3: 13.1 MMOL/L (ref 22–26)
HCO3: 7.7 MMOL/L (ref 22–26)
HCT VFR BLD CALC: 21.9 % (ref 37–54)
HCT VFR BLD CALC: 22.3 % (ref 37–54)
HCT,ARTERIAL: 16 % (ref 37–54)
HCT,ARTERIAL: 17 % (ref 37–54)
HEMOGLOBIN: 6.6 G/DL (ref 12.5–16.5)
HEMOGLOBIN: 6.9 G/DL (ref 12.5–16.5)
HGB, ARTERIAL: 5.5 G/DL (ref 12.5–16.5)
HGB, ARTERIAL: 5.8 G/DL (ref 12.5–16.5)
HHB: 0.9 % (ref 0–5)
HHB: 1.5 % (ref 0–5)
INR BLD: 1.7
LAB: ABNORMAL
LAB: ABNORMAL
LACTIC ACID: 27.6 MMOL/L (ref 0.5–2.2)
LYMPHOCYTES ABSOLUTE: 3.24 E9/L (ref 1.5–4)
LYMPHOCYTES ABSOLUTE: 3.76 E9/L (ref 1.5–4)
LYMPHOCYTES RELATIVE PERCENT: 13.2 % (ref 20–42)
LYMPHOCYTES RELATIVE PERCENT: 37.7 % (ref 20–42)
Lab: ABNORMAL
Lab: ABNORMAL
MCH RBC QN AUTO: 29.9 PG (ref 26–35)
MCH RBC QN AUTO: 30.3 PG (ref 26–35)
MCHC RBC AUTO-ENTMCNC: 30.1 % (ref 32–34.5)
MCHC RBC AUTO-ENTMCNC: 30.9 % (ref 32–34.5)
MCV RBC AUTO: 97.8 FL (ref 80–99.9)
MCV RBC AUTO: 99.1 FL (ref 80–99.9)
METAMYELOCYTES RELATIVE PERCENT: 1.8 % (ref 0–1)
METAMYELOCYTES RELATIVE PERCENT: 2.6 % (ref 0–1)
METHB: 0.4 % (ref 0–1.5)
METHB: 0.6 % (ref 0–1.5)
MODE: ABNORMAL
MODE: AC
MODE: AC
MONOCYTES ABSOLUTE: 0.5 E9/L (ref 0.1–0.95)
MONOCYTES ABSOLUTE: 0.75 E9/L (ref 0.1–0.95)
MONOCYTES RELATIVE PERCENT: 2.6 % (ref 2–12)
MONOCYTES RELATIVE PERCENT: 5.3 % (ref 2–12)
MYELOCYTE PERCENT: 2.6 % (ref 0–0)
NEUTROPHILS ABSOLUTE: 20.67 E9/L (ref 1.8–7.3)
NEUTROPHILS ABSOLUTE: 5.35 E9/L (ref 1.8–7.3)
NEUTROPHILS RELATIVE PERCENT: 48.3 % (ref 43–80)
NEUTROPHILS RELATIVE PERCENT: 80.7 % (ref 43–80)
NUCLEATED RED BLOOD CELLS: 0 /100 WBC
NUCLEATED RED BLOOD CELLS: 4.4 /100 WBC
O2 CONTENT: 11.2 ML/DL
O2 CONTENT: 11.2 ML/DL
O2 SATURATION: 98.1 % (ref 92–98.5)
O2 SATURATION: 98.5 % (ref 92–98.5)
O2 SATURATION: 99.1 % (ref 92–98.5)
O2 SATURATION: 99.9 % (ref 92–98.5)
O2HB: 96.7 % (ref 94–97)
O2HB: 97.4 % (ref 94–97)
OPERATOR ID: 2086
OPERATOR ID: 2086
OPERATOR ID: 5404
OPERATOR ID: 5404
OVALOCYTES: ABNORMAL
OVALOCYTES: ABNORMAL
PATIENT TEMP: 37 C
PATIENT TEMP: 37 C
PCO2 ARTERIAL: 43.8 MMHG (ref 35–45)
PCO2 ARTERIAL: 51.7 MMHG (ref 35–45)
PCO2: 49.5 MMHG (ref 35–45)
PCO2: 52.7 MMHG (ref 35–45)
PDW BLD-RTO: 14.1 FL (ref 11.5–15)
PDW BLD-RTO: 14.4 FL (ref 11.5–15)
PEEP/CPAP: 5 CMH2O
PFO2: 2.13 MMHG/%
PFO2: 2.91 MMHG/%
PH BLOOD GAS: 6.81 (ref 7.35–7.45)
PH BLOOD GAS: 7.01 (ref 7.35–7.45)
PH BLOOD GAS: 7.02 (ref 7.35–7.45)
PH BLOOD GAS: 7.09 (ref 7.35–7.45)
PLATELET # BLD: 101 E9/L (ref 130–450)
PLATELET # BLD: 154 E9/L (ref 130–450)
PMV BLD AUTO: 10.3 FL (ref 7–12)
PMV BLD AUTO: 9.2 FL (ref 7–12)
PO2 ARTERIAL: 144.8 MMHG (ref 80–100)
PO2 ARTERIAL: 374.7 MMHG (ref 80–100)
PO2: 212.6 MMHG (ref 60–100)
PO2: 290.9 MMHG (ref 60–100)
POIKILOCYTES: ABNORMAL
POIKILOCYTES: ABNORMAL
POLYCHROMASIA: ABNORMAL
POLYCHROMASIA: ABNORMAL
POSITIVE END EXP PRESS: 5 CMH2O
POTASSIUM SERPL-SCNC: 6.1 MMOL/L (ref 3.5–5.5)
POTASSIUM SERPL-SCNC: 6.7 MMOL/L (ref 3.5–5)
POTASSIUM SERPL-SCNC: 7.5 MMOL/L (ref 3.5–5)
PROTHROMBIN TIME: 19.3 SEC (ref 9.3–12.4)
RBC # BLD: 2.21 E12/L (ref 3.8–5.8)
RBC # BLD: 2.28 E12/L (ref 3.8–5.8)
RESPIRATORY RATE: 24 B/MIN
RI(T): 119 %
RI(T): 199 %
RR MECHANICAL: 22 B/MIN
SODIUM BLD-SCNC: 151 MMOL/L (ref 132–146)
SODIUM BLD-SCNC: 153 MMOL/L (ref 132–146)
SOURCE, BLOOD GAS: ABNORMAL
THB: 7.6 G/DL (ref 11.5–16.5)
THB: 7.8 G/DL (ref 11.5–16.5)
TIDAL VOLUME: 500 ML
TIME ANALYZED: 1220
TIME ANALYZED: 935
TOTAL PROTEIN: 3.2 G/DL (ref 6.4–8.3)
TOTAL PROTEIN: 3.3 G/DL (ref 6.4–8.3)
TOXIC GRANULATION: ABNORMAL
TOXIC GRANULATION: ABNORMAL
VT MECHANICAL: 500 ML
WBC # BLD: 24.9 E9/L (ref 4.5–11.5)
WBC # BLD: 9.9 E9/L (ref 4.5–11.5)

## 2019-06-17 PROCEDURE — 6360000004 HC RX CONTRAST MEDICATION: Performed by: RADIOLOGY

## 2019-06-17 PROCEDURE — 96365 THER/PROPH/DIAG IV INF INIT: CPT

## 2019-06-17 PROCEDURE — 86900 BLOOD TYPING SEROLOGIC ABO: CPT

## 2019-06-17 PROCEDURE — 71260 CT THORAX DX C+: CPT

## 2019-06-17 PROCEDURE — 85025 COMPLETE CBC W/AUTO DIFF WBC: CPT

## 2019-06-17 PROCEDURE — 86920 COMPATIBILITY TEST SPIN: CPT

## 2019-06-17 PROCEDURE — 2580000003 HC RX 258: Performed by: EMERGENCY MEDICINE

## 2019-06-17 PROCEDURE — 6360000002 HC RX W HCPCS: Performed by: STUDENT IN AN ORGANIZED HEALTH CARE EDUCATION/TRAINING PROGRAM

## 2019-06-17 PROCEDURE — P9016 RBC LEUKOCYTES REDUCED: HCPCS

## 2019-06-17 PROCEDURE — 80053 COMPREHEN METABOLIC PANEL: CPT

## 2019-06-17 PROCEDURE — P9035 PLATELET PHERES LEUKOREDUCED: HCPCS

## 2019-06-17 PROCEDURE — 85610 PROTHROMBIN TIME: CPT

## 2019-06-17 PROCEDURE — 2500000003 HC RX 250 WO HCPCS: Performed by: EMERGENCY MEDICINE

## 2019-06-17 PROCEDURE — 36415 COLL VENOUS BLD VENIPUNCTURE: CPT

## 2019-06-17 PROCEDURE — 36430 TRANSFUSION BLD/BLD COMPNT: CPT

## 2019-06-17 PROCEDURE — 96366 THER/PROPH/DIAG IV INF ADDON: CPT

## 2019-06-17 PROCEDURE — 82803 BLOOD GASES ANY COMBINATION: CPT

## 2019-06-17 PROCEDURE — 71045 X-RAY EXAM CHEST 1 VIEW: CPT

## 2019-06-17 PROCEDURE — P9059 PLASMA, FRZ BETWEEN 8-24HOUR: HCPCS

## 2019-06-17 PROCEDURE — 93005 ELECTROCARDIOGRAM TRACING: CPT | Performed by: EMERGENCY MEDICINE

## 2019-06-17 PROCEDURE — P9012 CRYOPRECIPITATE EACH UNIT: HCPCS

## 2019-06-17 PROCEDURE — 31500 INSERT EMERGENCY AIRWAY: CPT

## 2019-06-17 PROCEDURE — 83605 ASSAY OF LACTIC ACID: CPT

## 2019-06-17 PROCEDURE — 2500000003 HC RX 250 WO HCPCS: Performed by: STUDENT IN AN ORGANIZED HEALTH CARE EDUCATION/TRAINING PROGRAM

## 2019-06-17 PROCEDURE — 6360000002 HC RX W HCPCS: Performed by: EMERGENCY MEDICINE

## 2019-06-17 PROCEDURE — 99291 CRITICAL CARE FIRST HOUR: CPT

## 2019-06-17 PROCEDURE — 86850 RBC ANTIBODY SCREEN: CPT

## 2019-06-17 PROCEDURE — 36556 INSERT NON-TUNNEL CV CATH: CPT

## 2019-06-17 PROCEDURE — 86901 BLOOD TYPING SEROLOGIC RH(D): CPT

## 2019-06-17 PROCEDURE — 2500000003 HC RX 250 WO HCPCS

## 2019-06-17 PROCEDURE — 96368 THER/DIAG CONCURRENT INF: CPT

## 2019-06-17 PROCEDURE — 2580000003 HC RX 258: Performed by: STUDENT IN AN ORGANIZED HEALTH CARE EDUCATION/TRAINING PROGRAM

## 2019-06-17 PROCEDURE — 82805 BLOOD GASES W/O2 SATURATION: CPT

## 2019-06-17 PROCEDURE — 2580000003 HC RX 258

## 2019-06-17 PROCEDURE — 6360000002 HC RX W HCPCS

## 2019-06-17 PROCEDURE — 32551 INSERTION OF CHEST TUBE: CPT

## 2019-06-17 PROCEDURE — 99292 CRITICAL CARE ADDL 30 MIN: CPT

## 2019-06-17 RX ORDER — CALCIUM CHLORIDE 100 MG/ML
INJECTION INTRAVENOUS; INTRAVENTRICULAR DAILY PRN
Status: COMPLETED | OUTPATIENT
Start: 2019-06-17 | End: 2019-06-17

## 2019-06-17 RX ORDER — EPINEPHRINE 1 MG/ML
INJECTION, SOLUTION, CONCENTRATE INTRAVENOUS CONTINUOUS PRN
Status: COMPLETED | OUTPATIENT
Start: 2019-06-17 | End: 2019-06-17

## 2019-06-17 RX ORDER — 0.9 % SODIUM CHLORIDE 0.9 %
250 INTRAVENOUS SOLUTION INTRAVENOUS ONCE
Status: DISCONTINUED | OUTPATIENT
Start: 2019-06-17 | End: 2019-06-17 | Stop reason: HOSPADM

## 2019-06-17 RX ORDER — 0.9 % SODIUM CHLORIDE 0.9 %
250 INTRAVENOUS SOLUTION INTRAVENOUS ONCE
Status: COMPLETED | OUTPATIENT
Start: 2019-06-17 | End: 2019-06-17

## 2019-06-17 RX ORDER — NOREPINEPHRINE BITARTRATE 1 MG/ML
INJECTION, SOLUTION INTRAVENOUS DAILY PRN
Status: COMPLETED | OUTPATIENT
Start: 2019-06-17 | End: 2019-06-17

## 2019-06-17 RX ORDER — 0.9 % SODIUM CHLORIDE 0.9 %
2000 INTRAVENOUS SOLUTION INTRAVENOUS ONCE
Status: COMPLETED | OUTPATIENT
Start: 2019-06-17 | End: 2019-06-17

## 2019-06-17 RX ADMIN — EPINEPHRINE 1 MG: 0.1 INJECTION, SOLUTION ENDOTRACHEAL; INTRACARDIAC; INTRAVENOUS at 09:10

## 2019-06-17 RX ADMIN — EPINEPHRINE 1 MG: 0.1 INJECTION, SOLUTION ENDOTRACHEAL; INTRACARDIAC; INTRAVENOUS at 10:10

## 2019-06-17 RX ADMIN — NOREPINEPHRINE BITARTRATE 10 MCG: 1 INJECTION INTRAVENOUS at 09:58

## 2019-06-17 RX ADMIN — CALCIUM CHLORIDE 1 G: 100 INJECTION, SOLUTION INTRAVENOUS; INTRAVENTRICULAR at 14:41

## 2019-06-17 RX ADMIN — EPINEPHRINE 1 MG: 0.1 INJECTION, SOLUTION ENDOTRACHEAL; INTRACARDIAC; INTRAVENOUS at 14:41

## 2019-06-17 RX ADMIN — EPINEPHRINE 250 MCG/MIN: 1 INJECTION, SOLUTION, CONCENTRATE INTRAVENOUS at 10:12

## 2019-06-17 RX ADMIN — CALCIUM CHLORIDE 2 G: 100 INJECTION, SOLUTION INTRAVENOUS; INTRAVENTRICULAR at 10:47

## 2019-06-17 RX ADMIN — EPINEPHRINE 1 MG: 0.1 INJECTION, SOLUTION ENDOTRACHEAL; INTRACARDIAC; INTRAVENOUS at 08:51

## 2019-06-17 RX ADMIN — EPINEPHRINE 20 MCG/MIN: 1 INJECTION, SOLUTION, CONCENTRATE INTRAVENOUS at 13:10

## 2019-06-17 RX ADMIN — SODIUM CHLORIDE 2000 ML: 9 INJECTION, SOLUTION INTRAVENOUS at 13:06

## 2019-06-17 RX ADMIN — EPINEPHRINE 1 MG: 0.1 INJECTION, SOLUTION ENDOTRACHEAL; INTRACARDIAC; INTRAVENOUS at 09:00

## 2019-06-17 RX ADMIN — SODIUM BICARBONATE 50 MEQ: 84 INJECTION, SOLUTION INTRAVENOUS at 10:00

## 2019-06-17 RX ADMIN — SODIUM BICARBONATE 50 MEQ: 84 INJECTION, SOLUTION INTRAVENOUS at 13:56

## 2019-06-17 RX ADMIN — SODIUM BICARBONATE 50 MEQ: 84 INJECTION, SOLUTION INTRAVENOUS at 14:35

## 2019-06-17 RX ADMIN — EPINEPHRINE 1 MG: 0.1 INJECTION, SOLUTION ENDOTRACHEAL; INTRACARDIAC; INTRAVENOUS at 08:46

## 2019-06-17 RX ADMIN — SODIUM CHLORIDE 250 ML: 9 INJECTION, SOLUTION INTRAVENOUS at 13:13

## 2019-06-17 RX ADMIN — VASOPRESSIN 0.03 UNITS/MIN: 20 INJECTION INTRAVENOUS at 13:11

## 2019-06-17 RX ADMIN — IOPAMIDOL 80 ML: 755 INJECTION, SOLUTION INTRAVENOUS at 11:04

## 2019-06-17 RX ADMIN — SODIUM CHLORIDE 250 ML: 9 INJECTION, SOLUTION INTRAVENOUS at 13:12

## 2019-06-17 RX ADMIN — EPINEPHRINE 1 MG: 0.1 INJECTION, SOLUTION ENDOTRACHEAL; INTRACARDIAC; INTRAVENOUS at 14:43

## 2019-06-17 RX ADMIN — SODIUM BICARBONATE 50 MEQ: 84 INJECTION, SOLUTION INTRAVENOUS at 10:12

## 2019-06-17 RX ADMIN — EPINEPHRINE 1 MG: 0.1 INJECTION, SOLUTION ENDOTRACHEAL; INTRACARDIAC; INTRAVENOUS at 10:14

## 2019-06-17 RX ADMIN — EPINEPHRINE 1 MG: 0.1 INJECTION, SOLUTION ENDOTRACHEAL; INTRACARDIAC; INTRAVENOUS at 13:54

## 2019-06-17 RX ADMIN — EPINEPHRINE 1 MG: 0.1 INJECTION, SOLUTION ENDOTRACHEAL; INTRACARDIAC; INTRAVENOUS at 09:06

## 2019-06-17 RX ADMIN — EPINEPHRINE 1 MG: 0.1 INJECTION, SOLUTION ENDOTRACHEAL; INTRACARDIAC; INTRAVENOUS at 09:47

## 2019-06-17 RX ADMIN — NOREPINEPHRINE BITARTRATE 2 MCG/MIN: 1 INJECTION INTRAVENOUS at 16:00

## 2019-06-17 RX ADMIN — SODIUM BICARBONATE 50 MEQ: 84 INJECTION, SOLUTION INTRAVENOUS at 09:50

## 2019-06-17 RX ADMIN — SODIUM BICARBONATE 150 ML: 84 INJECTION, SOLUTION INTRAVENOUS at 10:04

## 2019-06-17 RX ADMIN — EPINEPHRINE 1 MG: 0.1 INJECTION, SOLUTION ENDOTRACHEAL; INTRACARDIAC; INTRAVENOUS at 08:48

## 2019-06-17 RX ADMIN — SODIUM BICARBONATE 50 MEQ: 84 INJECTION, SOLUTION INTRAVENOUS at 08:46

## 2019-06-17 RX ADMIN — SODIUM BICARBONATE: 84 INJECTION, SOLUTION INTRAVENOUS at 13:10

## 2019-06-17 RX ADMIN — SODIUM BICARBONATE 50 MEQ: 84 INJECTION, SOLUTION INTRAVENOUS at 09:10

## 2019-06-17 RX ADMIN — SODIUM BICARBONATE 50 MEQ: 84 INJECTION, SOLUTION INTRAVENOUS at 08:47

## 2019-06-17 RX ADMIN — EPINEPHRINE 1 MG: 0.1 INJECTION, SOLUTION ENDOTRACHEAL; INTRACARDIAC; INTRAVENOUS at 09:43

## 2019-06-17 RX ADMIN — SODIUM BICARBONATE 150 MEQ: 84 INJECTION, SOLUTION INTRAVENOUS at 10:31

## 2019-06-17 RX ADMIN — SODIUM BICARBONATE 50 MEQ: 84 INJECTION, SOLUTION INTRAVENOUS at 09:46

## 2019-06-17 NOTE — ED NOTES
Dr patel in room as patients ETCO2 was 12 and pulse was dropping     Lisa Monae.  Birgit Pulido RN  06/17/19 2796

## 2019-06-17 NOTE — CODE DOCUMENTATION
Pulse lost, compression began. Pt appears to be in v-fib at this time. 2 shocks given. 2g Magnesium along with 100 mg lidocaine.

## 2019-06-17 NOTE — ED NOTES
Lab called and reported critical value Hgb 6.9, notified Dr. Bethanie No and Jacky Naqvi RN. No verbal orders given.      Elizabeth Romero RN  06/17/19 6719

## 2019-06-17 NOTE — PROGRESS NOTES
called the [facility] to get the patient transferred out to [facility],  Waiting on call back from the critical care doc, and the coronary icu

## 2019-06-17 NOTE — ED NOTES
RN tubed white papers for trauma blood to blood bank. Was received by John Shine in blood bank     Octopus Deploy.  Delmis Marte RN  06/17/19 7917

## 2019-06-17 NOTE — CODE DOCUMENTATION
Needle decompression on right lung by Dr. Kavon Pierre. Copious amount of blood returned from needle.  Dr. Roger Ripa to place chest tube

## 2019-06-17 NOTE — ED NOTES
Critical Care Team is at bedside and family also.  They are updated and team is communicating with ER physicians     Rachel Molina RN  06/17/19 1773

## 2019-06-17 NOTE — ED NOTES
Blood bank called for 2 units uncrossmatched. Ciara Weaver sent to retrieve blood.       Irma Meade RN  06/17/19 9063

## 2019-06-17 NOTE — CONSULTS
Critical Care Team: Daily Progress Note         Date and time: 6/17/2019 3:08 PM  Patient's name:  Hu Regalado Record Number: [id number]  Patient's account/billing number: [de-identified]  Patient's YOB: 1970  Age: 53 y.o. Date of Admission: 6/17/2019  8:49 AM  Length of stay during current admission: 0  Primary Care Physician: Tonya Douglas DO  Previous Pulmonary: N/A  Code Status: No Order    PMH:    Past Medical History:   Diagnosis Date    Cancer ([address] 11/2018    lung ca right upper lung panko       PSH:   Past Surgical History:   Procedure Laterality Date    INSERTION / REMOVAL / REPLACEMENT VENOUS ACCESS CATHETER Right 1/29/2019    MEDI PORT INSERTION performed by Dolores Bragg MD at [address] [place] [address]  01/2019       Reason for ICU admission:   1. Hemorrhagic Shock    Subjective:     Mr. Marcy Hubbard is a 54 y/o  male with past medical history for recent CT surgery for pancoast tumor in [facility]. Patient came in as PEA arrest.  Patient had needle decompression to right anterior chest with bright red blood returned. Large bore chest tube placed right side with eventual drainage of 5 L of blood. CT chest ordered that noted loculated blood pockets. To this point 6 PRBC, 2 FFP, 1 SDP given. Cryoprecipitate and 2 more FFP pending. Patient to be transferred to [facility] via ground transport given inclement weather.     Current ventilation:     [x] Ventilator  [] BIPAP/AVAPS  [] Nasal Cannula [] Room Air      Secretions      Amount:  [] Small [] Moderate  _ Large  [x] None  Color:     - White - Colored  - Bloody    Sedation:  RAAS Score:  [] Propofol gtt  [] Fentanyl gtt  [] Ativan gtt   [] No Sedation    Paralyzed?:  [] No    [] Yes    Vasopressors:  [] No    [x] Yes    If yes -   [] Levophed       [] Dopamine     [x] Vasopressin       [] Dobutamine  [] Phenylephrine         [x] Epinephrine    Central line:     [] No   [x] Yes         If yes -       [] Right IJ     [] Left IJ [x] Right Femoral [] Left Femoral                   [] Right Subclavian [] Left Subclavian     Goddard catheter:   [] No   [x] Yes     Urine output:            [] Good   [x] Low              [] Anuric    Objective:     Vital signs:  BP (!) 75/35   Pulse 114   Temp (!) 90.2 °F (32.3 °C)   Resp 24   Ht 6' 1\" (1.854 m)   Wt 205 lb (93 kg)   SpO2 91%   BMI 27.05 kg/m²   Tmax over 24 hours:  Temp (24hrs), Av.3 °F (32.4 °C), Min:90.2 °F (32.3 °C), Max:90.3 °F (32.4 °C)      Patient Vitals for the past 6 hrs:   BP Temp Pulse Resp SpO2 Height Weight   19 1449 (!) 75/35 -- 114 24 -- -- --   19 1432 (!) 42 -- 89 -- -- -- --   19 1419 (!)  -- 90 -- -- -- --   19 1412 -- (!) 90.2 °F (32.3 °C) -- -- -- -- --   19 1412 (!)  -- 92 -- -- -- --   19 1400 -- -- 105 24 -- -- --   19 1359 (!) 102 -- -- -- -- -- --   19 1307 (!)  (!) 90.3 °F (32.4 °C) 100 24 -- -- --   19 1216 (!) 70 -- -- -- -- -- --   19 1144 (!)  -- -- -- -- -- --   19 1130 (!)  -- 105 -- -- -- --   19 1115 (!)  -- 100 -- -- -- --   19 1109 (!)  -- 102 -- -- -- --   19 1100 (!)  -- -- -- -- -- --   19 1050 -- -- -- -- 91 % -- --   19 1045 (!)  -- 122 -- (!) 88 % -- --   19 1041 (!)  -- -- -- -- -- --   19 1030 (!)  -- 91 -- (!) 79 % -- --   19 1027 (!)  -- -- -- -- -- --   19 1026 (!)  -- 80 -- -- -- --   19 1020 (!) 6036 -- -- -- -- -- --   19 1019 (!) /45 -- 76 -- -- -- --   19 1005 (!)  -- 84 -- -- -- --   19 1000 (!)  -- 87 -- (!) 75 % -- --   19 0959 (!)  -- -- -- -- -- --   19 0954 (!) 59/35 -- 88 -- -- -- --   19 0953 (!) 68/49 -- 93 -- -- -- --   19 0951 -- -- 93 22 (!) 74 % -- --   19 0935 -- -- 90 -- -- -- --   19 0915 120/77 -- -- -- -- 6' 1\" (1.854 m) 205 lb (93 kg)         Intake/Output Summary (Last 24 hours) at 6/17/2019 1508  Last data filed at 6/17/2019 1449  Gross per 24 hour   Intake 1398 ml   Output --   Net 1398 ml     Wt Readings from Last 2 Encounters:   06/17/19 205 lb (93 kg)   04/23/19 205 lb (93 kg)     Body mass index is 27.05 kg/m². Physical examination:    General: No distress. Eyes: PERRL. No sclera icterus. No conjunctival injection. ENT: No discharge. Pharynx clear. Neck: Trachea midline. Normal thyroid. Resp: No accessory muscle use. No rales. No wheezing. No rhonchi. CV: Regular rate. Regular rhythm. No murmur or rub. Abd: Non-tender. Non-distended. No masses. No organmegaly. Normal bowel sounds. Skin: Warm and dry. No nodules on exposed extremities. No rash on exposed extremities. Ext: No cyanosis, clubbing, edema  Lymph: No cervical LAD. No supraclavicular LAD. M/S: No cyanosis. No joint deformity. No clubbing. Neuro: Positive pupils/gag/corneals. Normal pain response. Medications:    Scheduled Meds:   EPINEPHrine PF        sodium bicarbonate        sodium chloride  250 mL Intravenous Once    sodium chloride  250 mL Intravenous Once    sodium chloride  250 mL Intravenous Once    sodium chloride  250 mL Intravenous Once    sodium chloride  250 mL Intravenous Once     Continuous Infusions:   sodium bicarbonate infusion 150 mL/hr at 06/17/19 1310    EPINEPHrine infusion 20 mcg/min (06/17/19 1310)    vasopressin (Septic Shock) infusion 0.03 Units/min (06/17/19 1311)    norepinephrine       PRN Meds:          Vent Settings (Comprehensive) (if applicable):  Vent Information  Vent Type:  Other(comment)(HT70)  Vent Mode: AC/VC  Vt Ordered: 500 mL  Rate Set: 24 bmp  Peak Flow: 50 L/min  FiO2 : 100 %  PEEP/CPAP: 5  I Time/ I Time %: 0.8 s  Cuff Pressure (cm H2O): 29 cm H2O  Additional Respiratory  Assessments  Pulse: 114  Resp: 24  SpO2: (ETCO2 35)  End Tidal CO2: 16 (%)  Subglottic Suction Done?: No  Cuff Pressure (cm H2O): 29 cm H2O    ABGs:   Recent Labs     06/17/19  1220 06/17/19  1430   PH 7.012* 7.086*   PCO2 52.7*  --    PO2 212.6*  --    HCO3 13.1*  --    BE -17.0* -15.3*   O2SAT 98.5 98.1       Laboratory findings:    Complete Blood Count:   Recent Labs     06/17/19  0931 06/17/19  1208   WBC 9.9 24.9*   HGB 6.6* 6.9*   HCT 21.9* 22.3*   * 154        Last 3 Blood Glucose:   Recent Labs     06/17/19  0931 06/17/19  1208   GLUCOSE 240* 319*        PT/INR:    Lab Results   Component Value Date    PROTIME 19.3 06/17/2019    INR 1.7 06/17/2019     PTT:  No results found for: APTT, PTT    Comprehensive Metabolic Profile:   Recent Labs     06/17/19  0931 06/17/19  1022 06/17/19  1208   *  --  151*   K 7.5* 6.1* 6.7*     --  103   CO2 10*  --  15*   BUN 14  --  16   CREATININE 1.4*  --  1.7*   GLUCOSE 240*  --  319*   CALCIUM 8.0*  --  9.7   PROT 3.3*  --  3.2*   LABALBU 2.0*  --  1.9*   BILITOT <0.2  --  0.3   ALKPHOS 72  --  89   *  --  511*   *  --  503*      Magnesium:   Lab Results   Component Value Date    MG 1.9 04/01/2019     Phosphorus: No results found for: PHOS  Ionized Calcium: No results found for: CAION     Urinalysis:     Troponin: No results for input(s): TROPONINI in the last 72 hours. Microbiology past 24h:    Blood cultures:                 [] None drawn      [] Negative             []  Positive (Details:  )  Urine Culture:                   [] None drawn      [] Negative             []  Positive (Details:  )  Sputum Culture:               [] None drawn       [] Negative             []  Positive (Details:  )   Endotracheal aspirate:     [] None drawn       [] Negative             []  Positive (Details:  )     Other Pertinent Labs and Pathology Results:   1.      Radiology/Imaging:     CT Chest W Contrast   Final Result   Extensive postoperative changes in the right upper lobe and right   hilum with a persistent soft tissue density in the perihilar

## 2019-06-17 NOTE — ED NOTES
[id number] started. This is patient's 7th unit of PRBC's     Ahmet Jo.  David Velazquez RN  06/17/19 6739

## 2019-06-17 NOTE — ED NOTES
EMTALA form signed by patient's wife. Dr. Willa Tong in room to suture more for chest tube as it is leaking      Chelly Shin.  Yasmany Vallejo RN  06/17/19 6913

## 2019-06-17 NOTE — ED NOTES
HISTORY OF PRESENT ILLNESS:  (Nurses Notes Reviewed)    Chief Complaint:   Cardiac Arrest      Source of history provided by:  spouse/SO, relative(s) and EMS personnel. History/Exam Limitations: due to condition and acuity of illness. Bryant Duke is a 53 y.o. old male presenting to the emergency department, for cardiac arrest, which occured several minute(s) prior to arrival.   He has a history of lung cancer. Code Status on file: No Order. Duration:  Zero downtime from arrest until ambulance arrival due to EMS witnessed arrest. Total Time from arrest until hospital arrival unknown. Onset:  sudden. Witnessed: yes. Available History:      Prior Cardiac Disease:   Unknown. Drug Use/Overdose ? Unknown. Drowning ? Unknown. GI Bleeding ? Unknown. Hypothermia ? Unknown. Other:   N/A. Prehospital Care: Patient contacted emergency medical services for complaint of shortness of breath. Once emergency medical services arrived and picked up patient, patient went into cardiac arrest while on their gurney. CPR was performed immediately. Patient was found to be in V. tach multiple times in route to hospital and received 4 defibrillations, and a dose of amiodarone. Patient also received 3 epinephrines in route. He was intubated by EMS. On arrival to the emergency department patient was in active compressions by EMS crew. This was a witnessed arrest by EMS crew. Initial Rhythm: Asystole is his initial rhythm in emergency department. Prehospital Treatment:       CPR:   yes. Intubation:   yes     IV Established:   yes     Defibrillation:   yes     External Pacer:   no     Epinephrine:   yes     Atropine:   no     Response to Treatment:  Transient return of pulse: Yes. Sustained return of pulse:  Yes. Associated Signs and Symptoms (preceding arrest): Shortness of breath for multiple days.   Other History:   Patient recently underwent cardiothoracic surgery for removal of Pancoast tumor. Patient had 40% of his total lung capacity removed on the right side. He is being followed by [facility] clinic. Currently on no blood thinners. PAST MEDICAL, SURGICAL, SOCIAL AND FAMILY HISTORY SECTION    Past Medical History:  has a past medical history of Cancer ([address] Past Surgical History:  has a past surgical history that includes Lung biopsy (01/2019) and INSERTION / REMOVAL / REPLACEMENT VENOUS ACCESS CATHETER (Right, 1/29/2019). Social History:  reports that he has been smoking. He has a 10.00 pack-year smoking history. He has never used smokeless tobacco. He reports that he does not drink alcohol or use drugs. Family History: family history is not on file. The patients home medications have been reviewed. Allergies: Patient has no known allergies. REVIEW OF SYSTEMS:   Please note that portions of/or complete areas of the HPI, Past History or ROS, may be limited/incomplete due to this patients acuity of illness and/or lack of history availble at time of presentation to Emergency Department. Pertinent positives and negatives are stated within HPI, all other systems reviewed and are negative. PHYSICAL EXAM:   General: Unresponsive. Head: Atraumatic. Eyes: unreactive, Fixed and dilated  ENT: Airway patent. ETT in place. Cardiovascular: Heart sounds are Normal.  Pulses are Absent. Respiratory: No spontaneous respirations. Equal breath sounds with controlled ventilation. Abdominal: Non-distended. Musculoskeletal: No deformities. Skin: Pallor. Neurological: Unresponsive.  Neurological exam limited due to clinical condition.       ------------------------------------------------ RESULTS ---------------------------------------------------    LABS  Results for orders placed or performed during the hospital encounter of 06/17/19   CBC Auto Differential   Result Value Ref Range    WBC 9.9 4.5 - 11.5 E9/L    RBC 2.21 (L) 3.80 - 5.80 E12/L    Hemoglobin 6.6 (LL) 12.5 - 16.5 g/dL    Hematocrit 21.9 (L) 37.0 - 54.0 %    MCV 99.1 80.0 - 99.9 fL    MCH 29.9 26.0 - 35.0 pg    MCHC 30.1 (L) 32.0 - 34.5 %    RDW 14.1 11.5 - 15.0 fL    Platelets 238 (L) 178 - 450 E9/L    MPV 10.3 7.0 - 12.0 fL    Neutrophils % 48.3 43.0 - 80.0 %    Lymphocytes % 37.7 20.0 - 42.0 %    Monocytes % 5.3 2.0 - 12.0 %    Eosinophils % 2.6 0.0 - 6.0 %    Basophils % 0.9 0.0 - 2.0 %    Neutrophils # 5.35 1.80 - 7.30 E9/L    Lymphocytes # 3.76 1.50 - 4.00 E9/L    Monocytes # 0.50 0.10 - 0.95 E9/L    Eosinophils # 0.26 0.05 - 0.50 E9/L    Basophils # 0.09 0.00 - 0.20 E9/L    Metamyelocytes Relative 2.6 (H) 0.0 - 1.0 %    Myelocytes Relative 2.6 0 - 0 %    nRBC 4.4 /100 WBC    Toxic Granulation 1+     Dohle Bodies 1+     Anisocytosis 2+     Polychromasia 2+     Poikilocytes 3+     Lewis Cells 3+     Ovalocytes 1+    Comprehensive Metabolic Panel   Result Value Ref Range    Sodium 153 (H) 132 - 146 mmol/L    Potassium 7.5 (HH) 3.5 - 5.0 mmol/L    Chloride 107 98 - 107 mmol/L    CO2 10 (L) 22 - 29 mmol/L    Anion Gap 36 (H) 7 - 16 mmol/L    Glucose 240 (H) 74 - 99 mg/dL    BUN 14 6 - 20 mg/dL    CREATININE 1.4 (H) 0.7 - 1.2 mg/dL    GFR Non-African American >60 >=60 mL/min/1.73    GFR African American >60     Calcium 8.0 (L) 8.6 - 10.2 mg/dL    Total Protein 3.3 (L) 6.4 - 8.3 g/dL    Alb 2.0 (L) 3.5 - 5.2 g/dL    Total Bilirubin <0.2 0.0 - 1.2 mg/dL    Alkaline Phosphatase 72 40 - 129 U/L     (H) 0 - 40 U/L     (H) 0 - 39 U/L   Protime-INR   Result Value Ref Range    Protime 19.3 (H) 9.3 - 12.4 sec    INR 1.7    Lactic Acid, Plasma   Result Value Ref Range    Lactic Acid 27.6 (HH) 0.5 - 2.2 mmol/L   Blood Gas, Arterial   Result Value Ref Range    Date Analyzed [id number]     Time Analyzed 0935     Source: Blood Arterial     pH, Blood Gas 6.810 (LL) 7.350 - 7.450    PCO2 49.5 (H) 35.0 - 45.0 mmHg    PO2 290.9 (H) 60.0 - 100.0 mmHg 26.0 - 35.0 pg    MCHC 30.9 (L) 32.0 - 34.5 %    RDW 14.4 11.5 - 15.0 fL    Platelets 414 078 - 880 E9/L    MPV 9.2 7.0 - 12.0 fL    Neutrophils % 80.7 (H) 43.0 - 80.0 %    Lymphocytes % 13.2 (L) 20.0 - 42.0 %    Monocytes % 2.6 2.0 - 12.0 %    Eosinophils % 0.0 0.0 - 6.0 %    Basophils % 1.7 0.0 - 2.0 %    Neutrophils # 20.67 (H) 1.80 - 7.30 E9/L    Lymphocytes # 3.24 1.50 - 4.00 E9/L    Monocytes # 0.75 0.10 - 0.95 E9/L    Eosinophils # 0.00 (L) 0.05 - 0.50 E9/L    Basophils # 0.42 (H) 0.00 - 0.20 E9/L    Metamyelocytes Relative 1.8 (H) 0.0 - 1.0 %    nRBC 0.0 /100 WBC    Toxic Granulation 1+     Dohle Bodies 1+     Anisocytosis 1+     Polychromasia 1+     Poikilocytes 2+     [place] Cells 1+     Ovalocytes 1+     Basophilic Stippling 1+    Blood Gas, Arterial   Result Value Ref Range    Date Analyzed [id number]     Time Analyzed 1220     Source: Blood Arterial     pH, Blood Gas 7.012 (LL) 7.350 - 7.450    PCO2 52.7 (H) 35.0 - 45.0 mmHg    PO2 212.6 (H) 60.0 - 100.0 mmHg    HCO3 13.1 (L) 22.0 - 26.0 mmol/L    B.E. -17.0 (L) -3.0 - 3.0 mmol/L    O2 Sat 98.5 92.0 - 98.5 %    PO2/FIO2 2.13 mmHg/%    AaDO2 422.7 mmHg    RI(T) 199 %    O2Hb 96.7 94.0 - 97.0 %    COHb 1.2 0.0 - 1.5 %    MetHb 0.6 0.0 - 1.5 %    O2 Content 11.2 mL/dL    HHb 1.5 0.0 - 5.0 %    tHb (est) 7.8 (L) 11.5 - 16.5 g/dL    Mode AC     FIO2 100.0 %    Rr Mechanical 22.0 b/min    Vt Mechanical 500.0 mL    Peep/Cpap 5.0 cmH2O    Date Of Collection      Time Collected      Pt Temp 37.0 C     ID [id number]     Lab [id number]     Critical(s) Notified Handed report to Dr/RN    Arterial Blood Gas, Respiratory Only   Result Value Ref Range    Source: Arterial     FIO2 Arterial 100.0     pH, Blood Gas 7.086 (LL) 7.350 - 7.450    pCO2, Arterial 43.8 35.0 - 45.0 mmHg    pO2, Arterial 144.8 (H) 80.0 - 100.0 mmHg    HCO3, Arterial 13.1 (L) 22.0 - 26.0 mmol/L    B.E. -15.3 (L) -3.0 - 0.0 mmol/L    O2 Sat 98.1 92.0 - 98.5 %    HGB, Arterial 5.5 (LL) 12.5 - 16.5 g/dL PREPARE FRESH FROZEN PLASMA, 2 Units   Result Value Ref Range    Product Code Blood Bank [id number]     Description Blood Bank Plasma, Apheresis, 5 Day, Thawed     Unit Number [id number]     Dispense Status Blood Bank transfused     Product Code Blood Bank [id number]     Description Blood Bank Plasma, 5 Day, Thawed     Unit Number [id number]     Dispense Status Blood Bank transfused     Product Code Blood Bank [id number]     Description Blood Bank Plasma, 5 Day, Thawed     Unit Number [id number]     Dispense Status Blood Bank transfused     Product Code Blood Bank [id number]     Description Blood Bank Plasma, 5 Day, Thawed     Unit Number [id number]     Dispense Status Blood Bank transfused    ABORH TUBE   Result Value Ref Range    ABO/Rh A POS    PREPARE RBC (CROSSMATCH), 2 Units   Result Value Ref Range    Product Code Blood Bank A8675T71     Description Blood Bank Red Blood Cells, Leuko-reduced     Unit Number [id number]     Dispense Status Blood Bank transfused     Product Code Blood Bank O3020C04     Description Blood Bank Red Blood Cells, Leuko-reduced     Unit Number [id number]     Dispense Status Blood Bank transfused     Product Code Blood Bank A2557Z96     Description Blood Bank Red Blood Cells, Leuko-reduced     Unit Number [id number]     Dispense Status Blood Bank selected     Product Code Blood Bank K3104O70     Description Blood Bank Red Blood Cells, Leuko-reduced     Unit Number [id number]     Dispense Status Blood Bank selected     Product Code Blood Bank C8597R26     Description Blood Bank Red Blood Cells, Leuko-reduced     Unit Number [id number]     Dispense Status Blood Bank transfused     Product Code Blood Bank G6874S23     Description Blood Bank Red Blood Cells, Leuko-reduced     Unit Number [id number]     Dispense Status Blood Bank selected     Product Code Blood Bank N0578H07     Description Blood Bank Red Blood Cells, Leuko-reduced     Unit Number [id number] Dispense Status Blood Bank selected     Product Code Blood Bank [id number]     Description Blood Bank Red Blood Cells, Leuko-reduced     Unit Number [id number]     Dispense Status Blood Bank selected    PREPARE CRYOPRECIPITATE (CROSSMATCH), 2 Product   Result Value Ref Range    Product Code Blood Bank Q0298P94     Description Blood Bank Cryoprecipitate, Pooled, Thawed     Unit Number [id number]     Dispense Status Blood Bank transfused     Product Code Blood Bank K0546E82     Description Blood Bank Cryoprecipitate, Pooled, Thawed     Unit Number [id number]     Dispense Status Blood Bank transfused        RADIOLOGY  CT Chest W Contrast   Final Result   Extensive postoperative changes in the right upper lobe and right   hilum with a persistent soft tissue density in the perihilar region,   paramediastinal area with loculated pleural effusion in the right lung   and atelectasis in right lung. Recurrent malignancy is not excluded. Fracture of the right fourth rib with a small loculated pneumothorax   in the right lung base with indwelling chest tube and subcutaneous   emphysema in the right lateral chest wall. Fracture of the sternal body. ALERT:  THIS IS AN ABNORMAL REPORT      XR CHEST PORTABLE   Final Result   1. Interval placement right-sided chest tube as described above, with   right chest wall subcutaneous emphysema and diminished opacification   overlying the right upper lung. 2. Airspace disease in the right midlung and right lung base with a   suspected right pleural effusion. XR CHEST 1 VW   Final Result   ALERT:  THIS IS AN ABNORMAL REPORT   1. Masslike opacification of the right upper lung, nonspecific   finding, findings could reflect a large mass or malignancy. Other   etiologies are not excluded. Correlation with CT scan chest   recommended if clinically warranted.    2. Airspace disease in the right lung base also nonspecific, findings   can be seen in atelectasis/scarring 6/17/19 1272)       Re-examination(s):  Multiple reexaminations throughout the day to assess patient's heart rate and blood pressure. Patient required a great deal of resources in time on many members of the emergency department. Patient's family was kept up-to-date by attending physician and resident physician throughout the day. There are family meetings held a minimum of 6 occurrences. CONSULTATIONS:  Spoke with Dr. Gerald Lunsford at [facility].  He will come to the emergency department to consult. Recommends continuing fluid administration and transfusions. Later in the day recommended clamping off chest tube in [facility] to [facility] nod intrathoracic bleed. Also recommended cryoprecipitate to be used. Dr. Gerald Lunsford arrived and consulted several times throughout the day. Spoke with Dr. Enrique Sotelo (cardiothoracic surgery). Discussed case. He recommends the patient be transferred to [facility] for further management. States that if it is unable to have patient transferred to [facility] then he will accept patient for consult downtown. Spoke with Dr. Donnie Benavides (Cardiothoracic surgery). Discussed case. They will accept patient for transfer and further management to [facility]. Hopes is to send helicopter for transport. However, helicopter is not able to fly and current weather conditions. Ground transport will be sent with mobile ICU. PROCEDURES:  Chest Tube Placement Procedure Note    Indication: hemo-pneumothorax    Consent: Unable to be obtained due to the emergent nature of this procedure. Pre-Medication: none    Procedure: The patient was placed in a semirecumbent position with the head of the bed at 30 degrees. The right side was prepped with chlorhexidine. Local anesthesia over the insertion site was not performed due to emergent nature of this procedure. An incision was made laterally in the midaxillary line.   Blunt dissection up and over the rib was performed until access was obtained into the pleural cavity. A 20 Tongan chest tube was placed and connected to a pleurivac at 20 cm H2O. Initial output from the tube was 1500 cc or blood. The tube was sutured in place and the site was covered with an occlusive dressing. All connections were banded. Breath sounds after the procedure were improved. A chest x-ray was obtained to evaluate placement which showed good tube position, and showed partial expansion of the lung. The patient tolerated the procedure well. Complications: None      Central Line Placement Procedure Note    Indication: vascular access, hypovolemia, long term access, centrally administered medications, severe bleeding and need for frequent blood draws    Consent: Unable to be obtained due to the emergent nature of this procedure. Procedure: The patient was positioned appropriately and the skin over the right femoral vein was prepped with chlorhexidine. Local anesthesia was not performed due to the emergent nature of this procedure. A large bore needle was used to identify the vein. A guide wire was then inserted into the vein through the needle. A triple lumen catheter was then inserted into the vessel over the guide wire using the Seldinger technique. All ports showed good, free flowing blood return and were flushed with saline solution. The catheter was then securely fastened to the skin with suture at 20.0 cm. Two sutures were placed into the proximal eyelets. An antibiotic disk was placed and the site was then covered with a sterile dressing. A post procedure X-ray was not indicated. The patient tolerated the procedure well. Complications: None      Arterial Line Placement Procedure Note                     Indication: Need for serial blood work, arterial blood gases, severe hypotension, cardiovascular instability and shock    Consent: Unable to be obtained due to the emergent nature of this procedure.     Procedure: The skin over the right femoral artery was prepped with chlorhexidine. Local anesthesia was not performed due to the emergent nature of this procedure. A 20 gauge arterial line catheter was then inserted, using a modified Seldinger technique, into the vessel. The transducer set was then attached and securely fastened to the skin with sutures. Waveforms on the monitor were observed and found to be adequate. The patient had good distal perfusion after the procedure. The site was then dressed in a sterile fashion. The patient tolerated the procedure well. Complications: None      Progress Notes:  Patient arrived to the emergency department in cardiac arrest.  Cardiac arrest was witnessed by EMS. Prior to arrival the emergency department EMS administered 3 doses of epinephrine, performed intubation, and placed an interosseous line in the left tibia. Patient had complaint of shortness of breath prior to cardiac arrest.  Patient was recently seen at [facility] for cardiothoracic surgery to have a Pancoast tumor removed from the right lung. Estimation was 40% of patient's lung capacity was removed. This is per care everywhere notes from [facility]. In the emergency department after arrival of EMS central line was placed for administration of medications in left femoral vein. There was an attempt on the right side that was unsuccessful. Left side was accessed by attending physician. Interosseous line remained functional.  Chest x-ray showed good positioning of ET tube and large hemopneumothorax on right side. Right-sided chest tube was placed. A 20 [place]ian chest tube was inserted with good return. Initial output was 1500 cc of blood. Patient's oxygenation improved following chest tube placement. ROSC was achieved. Patient remained hypotensive with tachycardia. Bedside ultrasound showed no pericardial effusion. Family was updated. Pulses were lost again in the department.   Multiple performed there. [facility] accepted transfer patient. Unfortunately, helicopter was not able to be sent due to weather conditions. Ground transport was sent with mobile ICU. Critical care consult was sent out for additional help. Recommended cryoprecipitate for patient's continued bleeding. Patient had greater than 5 L of blood out during time in the emergency department. This was out through chest tube. Patient received 8 units of packed red blood cells, 4 units of fresh frozen plasma, and 1 unit of platelets. Additional platelets were ordered, but not available at this facility. Platelets were to be sent from downtown facility. However, mobile intensive care unit arrived prior to arrival of those platelets. Transfer was not delayed to wait for platelets. Mobile intensive care unit was sent with additional blood products, per precipitate, vasopressors, and other medications. Patient was transported out of the emergency department by 1600. Family was kept updated multiple times by resident and attending physicians throughout the day. Family agreed with plan with transfer. Counseling:   I have spoken with the spouse and family regarding the patient's treatment/condition at this time. --------------------------------------- IMPRESSION & DISPOSITION --------------------------------     IMPRESSION:  1. Cardiac arrest ([address]    2. Hemopneumothorax on right    3. Hypotension, unspecified hypotension type    4. Hypovolemia    5. Hyperkalemia        This patient's ED course included: a personal history and physicial examination, re-evaluation prior to disposition, multiple bedside re-evaluations, IV medications, cardiac monitoring, continuous pulse oximetry and complex medical decision making and emergency management    This patient has improved, been closely monitored and initially deteriorated, but then stabilized during their ED course.     Please note that the withdrawal or failure to initiate urgent interventions for this patient would likely result in a life threatening deterioration or permanent disability. Systems at risk for deterioration include: cardiovascular, neuro. Accordingly this patient received 240 minutes of critical care time, excluding separately billable procedures. DISPOSITION:  Disposition: Transfer to [facility].   Patient condition is critical.       Jody Edwards DO  Resident  06/18/19 8876

## 2019-06-17 NOTE — ED NOTES
Directly before transfer, 3 units of blood transfused to patient in emergent fashion. Unit #7 - [id number]  Unit #8 - [id number]  Unit #9 - [id number] - this unit was sent with [facility] transport and was currently infusing during departure. Along with this blood, [facility] transport was also sent with 2 units of FFP and 2 units of cryoprecipitate. Doris Haile.  Logan Rocha RN  06/17/19 6462

## 2019-06-17 NOTE — ED NOTES
Report given to Minerva Ramos at [facility] and; they are going to assess for flight and return call     Regis Thomas RN  06/17/19 1748

## 2019-06-18 LAB
EKG ATRIAL RATE: 61 BPM
EKG ATRIAL RATE: 97 BPM
EKG Q-T INTERVAL: 354 MS
EKG Q-T INTERVAL: 372 MS
EKG QRS DURATION: 116 MS
EKG QRS DURATION: 88 MS
EKG QTC CALCULATION (BAZETT): 459 MS
EKG QTC CALCULATION (BAZETT): 469 MS
EKG R AXIS: 77 DEGREES
EKG R AXIS: 90 DEGREES
EKG T AXIS: -27 DEGREES
EKG T AXIS: 95 DEGREES
EKG VENTRICULAR RATE: 101 BPM
EKG VENTRICULAR RATE: 96 BPM

## 2019-06-18 PROCEDURE — 93010 ELECTROCARDIOGRAM REPORT: CPT | Performed by: INTERNAL MEDICINE

## 2019-06-19 ENCOUNTER — HOSPITAL ENCOUNTER (OUTPATIENT)
Dept: INFUSION THERAPY | Age: 49
End: 2019-06-19
Payer: COMMERCIAL

## 2019-06-19 LAB
BLOOD BANK DISPENSE STATUS: NORMAL
BLOOD BANK PRODUCT CODE: NORMAL
BPU ID: NORMAL
DESCRIPTION BLOOD BANK: NORMAL

## 2019-07-16 NOTE — ED PROVIDER NOTES
Markus Goldberg DO   Resident   Emergency Medicine          ED Notes   Attested        Date of Service:  6/17/2019  8:49 AM                                Attested                   Attestation signed by Freida Gonzalez DO at 6/18/2019 7:15 AM        ATTENDING PROVIDER ATTESTATION:          Idania Lindo presented to the emergency department for evaluation of Cardiac Arrest     and was initially evaluated by the Medical Resident. See Original ED Note for H&P and ED course above. I have reviewed and discussed the case, including pertinent history (medical, surgical, family and social) and exam findings with the Medical Resident assigned to Idania Lindo. I have personally performed and/or participated in the history, exam, medical decision making, and procedures and agree with all pertinent clinical information. Seen and examined upon arrival.    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 Initial rhythm per EMS was PEA then V fib presented in asystole. CONNOR by EMS, Left leg IO placed by EMS. Needle placed in the 2nd ICS bright red blood    Chest tube placed. Initially 1400cc of blood drained. ROSC However see relapsed into Cardiac arrest. Was CVC x 2 PRBCs, FFP and Plts and Cryoprecipitate. Elevated K was treated. Consultation was made with intensivist, CVT surgery at [facility] and at the [facility]. STAT transfer was made to [facility]. Left department with pulse hypotensive, despite pressures. CRITICAL CARE:   240 MINUTES. Please note that the withdrawal or failure to initiate urgent interventions for this patient would likely result in a life threatening deterioration or permanent disability. Accordingly this patient received the above mentioned time, excluding separately billable procedures.                              PROCEDURE    6/17/19         Time: 0912         [place] LINE INSERTION    Risks, benefits and alternatives (for applicable procedures below) described. Performed By: Jose Manuel Ortiz DO. Indication: vascular access, poor peripheral access, hypovolemia and inability to gain peripheral access. Informed consent: Unable to be obtained due to patient's condition. .    Procedure: After routine sterile preparation, a left 3-Lumen 7F Central Venous Catheter was placed by femoral vein approach and secured by standard fashion. Ultrasound Guidance:   used. Number of Attempts: one    Post-procedure Findings: A post procedural chest x-ray  was not indicated. Patient tolerated the procedure well. I have reviewed my findings and recommendations with the assigned Medical Resident, Brendan Betancur and members of family present at the time of disposition. My findings/plan: The primary encounter diagnosis was Cardiac arrest [facility]). Diagnoses of Hemopneumothorax on right, Hypotension, unspecified hypotension type, Hypovolemia, and Hyperkalemia were also pertinent to this visit.       Discharge Medication List as of 6/17/2019  4:04 PM                    Jose Manuel Ortiz DO                                  Expand widget buttonCollapse widget button        Show:Clear all      ManualTemplateCopied    Added by:          DO Galian Rodriguez for detailscustomization button                                                                                                                                                                                                                                                                                                                                                                                                                                                                                                                                                                                                                                                                                                                        HISTORY OF PRESENT ILLNESS:  (Nurses Notes Reviewed)         Chief Complaint:     Cardiac Arrest              Source of history provided by:  spouse/SO, relative(s) and EMS personnel. History/Exam Limitations: due to condition and acuity of illness. Shaan Freed is a 53 y.o. old male presenting to the emergency department, for cardiac arrest, which occured several minute(s) prior to arrival.   He has a history of lung cancer. Code Status on file: No Order. Duration:  Zero downtime from arrest until ambulance arrival due to EMS witnessed arrest. Total Time from arrest until hospital arrival unknown. Onset:  sudden. Witnessed: yes. Available History:          Prior Cardiac Disease:       Unknown. Drug Use/Overdose ? Unknown. Drowning ? Unknown. GI Bleeding ? Unknown. Hypothermia ? Unknown. Other:       N/A. Prehospital Care: Patient contacted emergency medical services for complaint of shortness of breath. Once emergency medical services arrived and picked up patient, patient went into cardiac arrest while on their gurney. CPR was performed immediately.   Patient was found to be in V. tach multiple Basophils #     0.09     0.00 - 0.20 E9/L             Metamyelocytes Relative     2.6 (H)     0.0 - 1.0 %             Myelocytes Relative     2.6     0 - 0 %             nRBC     4.4     /100 WBC             Toxic Granulation     1+                   Dohle Bodies     1+                   Anisocytosis     2+                   Polychromasia     2+                   Poikilocytes     3+                   Lewis Cells     3+                   Ovalocytes     1+             Comprehensive Metabolic Panel       Result     Value     Ref Range             Sodium     153 (H)     132 - 146 mmol/L             Potassium     7.5 (HH)     3.5 - 5.0 mmol/L             Chloride     107     98 - 107 mmol/L             CO2     10 (L)     22 - 29 mmol/L             Anion Gap     36 (H)     7 - 16 mmol/L             Glucose     240 (H)     74 - 99 mg/dL             BUN     14     6 - 20 mg/dL             CREATININE     1.4 (H)     0.7 - 1.2 mg/dL             GFR Non-     >60     >=60 mL/min/1.73             GFR      >60                   Calcium     8.0 (L)     8.6 - 10.2 mg/dL             Total Protein     3.3 (L)     6.4 - 8.3 g/dL             Alb     2.0 (L)     3.5 - 5.2 g/dL             Total Bilirubin     <0.2     0.0 - 1.2 mg/dL             Alkaline Phosphatase     72     40 - 129 U/L             ALT     251 (H)     0 - 40 U/L             AST     252 (H)     0 - 39 U/L       Protime-INR       Result     Value     Ref Range             Protime     19.3 (H)     9.3 - 12.4 sec             INR     1.7             Lactic Acid, Plasma       Result     Value     Ref Range             Lactic Acid     27.6 (HH)     0.5 - 2.2 mmol/L       Blood Gas, Arterial       Result     Value     Ref Range             Date Analyzed     [id number]                   Time Analyzed     0935                   Source:     Blood Arterial                   pH, Blood Gas     6.810 (LL)     7.350 - 7.450             PCO2     49.5 (H)     35.0 - 45.0 mmHg             PO2     290.9 (H)     60.0 - 100.0 mmHg             HCO3     7.7 (L)     22.0 - 26.0 mmol/L             B.E.     -25.1 (L)     -3.0 - 3.0 mmol/L             O2 Sat     99.1 (H)     92.0 - 98.5 %             PO2/FIO2     2.91     mmHg/%             AaDO2     347.6     mmHg             RI(T)     119     %             O2Hb     97.4 (H)     94.0 - 97.0 %             COHb     1.3     0.0 - 1.5 %             MetHb     0.4     0.0 - 1.5 %             O2 Content     11.2     mL/dL             HHb     0.9     0.0 - 5.0 %             tHb (est)     7.6 (L)     11.5 - 16.5 g/dL             Mode     Bagging                   FIO2     100.0     %             Date Of Collection                         Time Collected                         Pt Temp     37.0     C              ID     [id number]                   Lab     [id number]                   Critical(s) Notified     Handed report to Dr/RN             Arterial Blood Gas, Respiratory Only       Result     Value     Ref Range             Source:     Arterial                   FIO2 Arterial     15.0                   pH, Blood Gas     7.023 (LL)     7.350 - 7.450             pCO2, Arterial     51.7 (H)     35.0 - 45.0 mmHg             pO2, Arterial     374.7 (H)     80.0 - 100.0 mmHg             HCO3, Arterial     13.4 (L)     22.0 - 26.0 mmol/L             B.E.     -16.0 (L)     -3.0 - 0.0 mmol/L             O2 Sat     99.9 (H)     92.0 - 98.5 %             Potassium     6.1 (H)     3.5 - 5.5 mmol/L             HGB, Arterial     5.8 (LL)     12.5 - 16.5 g/dL             HCT, Arterial     17.0 (L)     37.0 - 54.0 %              ID     2,086                   DEVICE     14,347,521,402,[id number]                   Critical Notification     Yes                   Delivery Systems     Bagging             Comprehensive Metabolic Panel       Result     Value     Ref Range             Sodium     151 (H)     132 - 146 mmol/L             Potassium     6.7 (HH)     3.5 - 5.0 mmol/L             Chloride     103     98 - 107 mmol/L             CO2     15 (L)     22 - 29 mmol/L             Anion Gap     33 (H)     7 - 16 mmol/L             Glucose     319 (H)     74 - 99 mg/dL             BUN     16     6 - 20 mg/dL             CREATININE     1.7 (H)     0.7 - 1.2 mg/dL             GFR Non-     52     >=60 mL/min/1.73             GFR      52                   Calcium     9.7     8.6 - 10.2 mg/dL             Total Protein     3.2 (L)     6.4 - 8.3 g/dL             Alb     1.9 (L)     3.5 - 5.2 g/dL             Total Bilirubin     0.3     0.0 - 1.2 mg/dL             Alkaline Phosphatase     89     40 - 129 U/L             ALT     503 (H)     0 - 40 U/L             AST     511 (H)     0 - 39 U/L       CBC auto differential       Result     Value     Ref Range             WBC     24.9 (H)     4.5 - 11.5 E9/L             RBC     2.28 (L)     3.80 - 5.80 E12/L             Hemoglobin     6.9 (LL)     12.5 - 16.5 g/dL             Hematocrit     22.3 (L)     37.0 - 54.0 %             MCV     97.8     80.0 - 99.9 fL             MCH     30.3     26.0 - 35.0 pg             MCHC     30.9 (L)     32.0 - 34.5 %             RDW     14.4     11.5 - 15.0 fL             Platelets     940     130 - 450 E9/L             MPV     9.2     7.0 - 12.0 fL             Neutrophils %     80.7 (H)     43.0 - 80.0 %             Lymphocytes %     13.2 (L)     20.0 - 42.0 %             Monocytes %     2.6     2.0 - 12.0 %             Eosinophils %     0.0     0.0 - 6.0 %             Basophils %     1.7     0.0 - 2.0 %             Neutrophils #     20.67 (H)     1.80 - 7.30 E9/L             Lymphocytes #     3.24     1.50 - 4.00 E9/L             Monocytes #     0.75     0.10 - 0.95 E9/L             Eosinophils #     0.00 (L)     0.05 - 0.50 E9/L             Basophils #     0.42 (H)     0.00 - 0.20 E9/L             Metamyelocytes Relative     1.8 (H)     0.0 - 1.0 % nRBC     0.0     /100 WBC             Toxic Granulation     1+                   Dohle Bodies     1+                   Anisocytosis     1+                   Polychromasia     1+                   Poikilocytes     2+                   [place] Cells     1+                   Ovalocytes     1+                   Basophilic Stippling     1+             Blood Gas, Arterial       Result     Value     Ref Range             Date Analyzed     [id number]                   Time Analyzed     1220                   Source:     Blood Arterial                   pH, Blood Gas     7.012 (LL)     7.350 - 7.450             PCO2     52.7 (H)     35.0 - 45.0 mmHg             PO2     212.6 (H)     60.0 - 100.0 mmHg             HCO3     13.1 (L)     22.0 - 26.0 mmol/L             B.E.     -17.0 (L)     -3.0 - 3.0 mmol/L             O2 Sat     98.5     92.0 - 98.5 %             PO2/FIO2     2.13     mmHg/%             AaDO2     422.7     mmHg             RI(T)     199     %             O2Hb     96.7     94.0 - 97.0 %             COHb     1.2     0.0 - 1.5 %             MetHb     0.6     0.0 - 1.5 %             O2 Content     11.2     mL/dL             HHb     1.5     0.0 - 5.0 %             tHb (est)     7.8 (L)     11.5 - 16.5 g/dL             Mode     AC                   FIO2     100.0     %             Rr Mechanical     22.0     b/min             Vt Mechanical     500.0     mL             Peep/Cpap     5.0     cmH2O             Date Of Collection                         Time Collected                         Pt Temp     37.0     C              ID     [id number]                   Lab     [id number]                   Critical(s) Notified     Handed report to Dr/RN             Arterial Blood Gas, Respiratory Only       Result     Value     Ref Range             Source:     Arterial                   FIO2 Arterial     100.0                   pH, Blood Gas     7.086 (LL)     7.350 - 7.450             pCO2, Arterial     43.8     35.0 - 45.0 mmHg pO2, Arterial     144.8 (H)     80.0 - 100.0 mmHg             HCO3, Arterial     13.1 (L)     22.0 - 26.0 mmol/L             B.E.     -15.3 (L)     -3.0 - 0.0 mmol/L             O2 Sat     98.1     92.0 - 98.5 %             HGB, Arterial     5.5 (LL)     12.5 - 16.5 g/dL             HCT, Arterial     16.0 (L)     37.0 - 54.0 %              ID     2,086                   DEVICE     14,347,521,402,[id number]                   Critical Notification     Yes                   Mode     AC                   Tidal Volume     500     mL             Positive End EXP Press     5     cmH2O             Respiratory Rate     24     b/min             Delivery Systems     ETTube             TYPE AND SCREEN       Result     Value     Ref Range             ABO/Rh     CANCELED                   Antibody Screen     NEG             PREPARE RBC (CROSSMATCH)       Result     Value     Ref Range             Product Code Blood Bank     [id number]                   Description Blood Bank     Red Blood Cells, Apheresis, Leuko-reduced                   Unit Number     [id number]                   Dispense Status Blood Bank     transfused                   Product Code Blood Bank     [id number]                   Description Blood Bank     Red Blood Cells, Leuko-reduced                   Unit Number     [id number]                   Dispense Status Blood Bank     transfused                   Product Code Blood Bank     [id number]                   Description Blood Bank     Red Blood Cells, Leuko-reduced                   Unit Number     [id number]                   Dispense Status Blood Bank     transfused                   Product Code Blood Bank     [id number]                   Description Blood Bank     Red Blood Cells, Leuko-reduced                   Unit Number     [id number]                   Dispense Status Blood Bank     transfused                   Product Code Blood Bank     [id number]                   Description Blood Bank     Red Blood Cells, Leuko-reduced                   Unit Number     [id number]                   Dispense Status Blood Bank     transfused                   Product Code Blood Bank     [id number]                   Description Blood Bank     Red Blood Cells, Leuko-reduced                   Unit Number     [id number]                   Dispense Status Blood Bank     transfused                   Product Code Blood Bank     [id number]                   Description Blood Bank     Red Blood Cells, Leuko-reduced                   Unit Number     [id number]                   Dispense Status Blood Bank     transfused                   Product Code Blood Bank     [id number]                   Description Blood Bank     Red Blood Cells, Leuko-reduced                   Unit Number     [id number]                   Dispense Status Blood Bank     transfused             PREPARE PLATELETS (CROSSMATCH), 1 Product       Result     Value     Ref Range             Product Code Blood Bank     [id number]                   Description Blood Bank                        Unit Number     [id number]                   Dispense Status Blood Bank     transfused             PREPARE FRESH FROZEN PLASMA, 2 Units       Result     Value     Ref Range             Product Code Blood Bank     [id number]                   Description Blood Bank     Plasma, Apheresis, 5 Day, Thawed                   Unit Number     [id number]                   Dispense Status Blood Bank     transfused                   Product Code Blood Bank     [id number]                   Description Blood Bank     Plasma, 5 Day, Thawed                   Unit Number     [id number]                   Dispense Status Blood Bank     transfused                   Product Code Blood Bank     [id number]                   Description Blood Bank     Plasma, 5 Day, Thawed                   Unit Number     [id number]                   Dispense Status Blood Bank     transfused Product Code Blood Bank     [id number]                   Description Blood Bank     Plasma, 5 Day, Thawed                   Unit Number     [id number]                   Dispense Status Blood Bank     transfused             ABORH TUBE       Result     Value     Ref Range             ABO/Rh     A POS             PREPARE RBC (CROSSMATCH), 2 Units       Result     Value     Ref Range             Product Code Blood Bank     [id number]                   Description Blood Bank     Red Blood Cells, Leuko-reduced                   Unit Number     [id number]                   Dispense Status Blood Bank     transfused                   Product Code Blood Bank     [id number]                   Description Blood Bank     Red Blood Cells, Leuko-reduced                   Unit Number     [id number]                   Dispense Status Blood Bank     transfused                   Product Code Blood Bank     [id number]                   Description Blood Bank     Red Blood Cells, Leuko-reduced                   Unit Number     [id number]                   Dispense Status Blood Bank     selected                   Product Code Blood Bank     [id number]                   Description Blood Bank     Red Blood Cells, Leuko-reduced                   Unit Number     [id number]                   Dispense Status Blood Bank     selected                   Product Code Blood Bank     [id number]                   Description Blood Bank     Red Blood Cells, Leuko-reduced                   Unit Number     [id number]                   Dispense Status Blood Bank     transfused                   Product Code Blood Bank     [id number]                   Description Blood Bank     Red Blood Cells, Leuko-reduced                   Unit Number     [id number]                   Dispense Status Blood Bank     selected                   Product Code Blood Bank     [id number]                   Description Blood Bank     Red Blood Cells, Leuko-reduced Unit Number     [id number]                   Dispense Status Blood Bank     selected                   Product Code Blood Bank     [id number]                   Description Blood Bank     Red Blood Cells, Leuko-reduced                   Unit Number     [id number]                   Dispense Status Blood Bank     selected             PREPARE CRYOPRECIPITATE (CROSSMATCH), 2 Product       Result     Value     Ref Range             Product Code Blood Bank     [id number]                   Description Blood Bank     Cryoprecipitate, Pooled, Thawed                   Unit Number     [id number]                   Dispense Status Blood Bank     transfused                   Product Code Blood Bank     [id number]                   Description Blood Bank     Cryoprecipitate, Pooled, Thawed                   Unit Number     [id number]                   Dispense Status Blood Bank     transfused                       RADIOLOGY      CT Chest W Contrast       Final Result       Extensive postoperative changes in the right upper lobe and right       hilum with a persistent soft tissue density in the perihilar region,       paramediastinal area with loculated pleural effusion in the right lung       and atelectasis in right lung. Recurrent malignancy is not excluded. Fracture of the right fourth rib with a small loculated pneumothorax       in the right lung base with indwelling chest tube and subcutaneous       emphysema in the right lateral chest wall. Fracture of the sternal body. ALERT:  THIS IS AN ABNORMAL REPORT               XR CHEST PORTABLE       Final Result       1. Interval placement right-sided chest tube as described above, with       right chest wall subcutaneous emphysema and diminished opacification       overlying the right upper lung. 2. Airspace disease in the right midlung and right lung base with a       suspected right pleural effusion. XR CHEST 1 VW       Final Result       ALERT:  THIS IS AN ABNORMAL REPORT       1. Masslike opacification of the right upper lung, nonspecific       finding, findings could reflect a large mass or malignancy. Other       etiologies are not excluded. Correlation with CT scan chest       recommended if clinically warranted. 2. Airspace disease in the right lung base also nonspecific, findings       can be seen in atelectasis/scarring and/or infiltrate/pneumonia. EKG:      None.                   ---------------------------- NURSING NOTES AND VITALS REVIEWED -------------------------                The nursing notes within the ED encounter and vital signs as below have been reviewed.      BP (!) 75/35   Pulse 114   Temp (!) 90.2 °F (32.3 °C)   Resp 24   Ht 6' 1\" (1.854 m)   Wt 205 lb (93 kg)   SpO2 91%   BMI 27.05 kg/m²     Oxygen Saturation Interpretation: Improved after treatment              ------------------------------------------PROGRESS NOTES -------------------------------------------         ED COURSE MEDICATIONS:                      Medications       0.9 % sodium chloride bolus (0 mLs Intravenous Stopped 6/17/19 1406)       0.9 % sodium chloride bolus (0 mLs Intravenous Stopped 6/17/19 1558)       0.9 % sodium chloride bolus (0 mLs Intravenous Stopped 6/17/19 1558)       iopamidol (ISOVUE-370) 76 % injection 80 mL (80 mLs Intravenous Given 6/17/19 1104)       EPINEPHrine PF 1 MG/10ML injection (1 mg Intravenous Given 6/17/19 0947)       sodium bicarbonate 8.4 % injection (50 mEq Intravenous Given 6/17/19 0950)       norepinephrine (LEVOPHED) injection (10 mcg  Given 6/17/19 0958)       sodium bicarbonate 8.4 % injection (150 mEq Intravenous Given 6/17/19 1031)       0.9 % sodium chloride bolus (0 mLs Intravenous Stopped 6/17/19 1602)       EPINEPHrine PF 1 MG/10ML injection (1 mg Intravenous Given 6/17/19 1014)       EPINEPHrine PF 1 MG/ML injection (  Patient conditions. Ground transport will be sent with mobile ICU. PROCEDURES:    Chest Tube Placement Procedure Note         Indication: hemo-pneumothorax         Consent: Unable to be obtained due to the emergent nature of this procedure. Pre-Medication: none         Procedure: The patient was placed in a semirecumbent position with the head of the bed at 30 degrees. The right side was prepped with chlorhexidine. Local anesthesia over the insertion site was not performed due to emergent nature of this procedure. An incision was made laterally in the midaxillary line. Blunt dissection up and over the rib was performed until access was obtained into the pleural cavity. A 20 [place] chest tube was placed and connected to a pleurivac at 20 cm H2O. Initial output from the tube was 1500 cc or blood. The tube was sutured in place and the site was covered with an occlusive dressing. All connections were banded. Breath sounds after the procedure were improved. A chest x-ray was obtained to evaluate placement which showed good tube position, and showed partial expansion of the lung. The patient tolerated the procedure well. Complications: None              Central Line Placement Procedure Note         Indication: vascular access, hypovolemia, long term access, centrally administered medications, severe bleeding and need for frequent blood draws         Consent: Unable to be obtained due to the emergent nature of this procedure. Procedure: The patient was positioned appropriately and the skin over the right femoral vein was prepped with chlorhexidine. Local anesthesia was not performed due to the emergent nature of this procedure. A large bore needle was used to identify the vein. A guide wire was then inserted into the vein through the needle. A triple lumen catheter was then inserted into the vessel over the guide wire using the Seldinger technique.   All ports showed good, free flowing blood return and were flushed with saline solution. The catheter was then securely fastened to the skin with suture at 20.0 cm. Two sutures were placed into the proximal eyelets. An antibiotic disk was placed and the site was then covered with a sterile dressing. A post procedure X-ray was not indicated. The patient tolerated the procedure well. Complications: None              Arterial Line Placement Procedure Note                         Indication: Need for serial blood work, arterial blood gases, severe hypotension, cardiovascular instability and shock         Consent: Unable to be obtained due to the emergent nature of this procedure. Procedure: The skin over the right femoral artery was prepped with chlorhexidine. Local anesthesia was not performed due to the emergent nature of this procedure. A 20 gauge arterial line catheter was then inserted, using a modified Seldinger technique, into the vessel. The transducer set was then attached and securely fastened to the skin with sutures. Waveforms on the monitor were observed and found to be adequate. The patient had good distal perfusion after the procedure. The site was then dressed in a sterile fashion. The patient tolerated the procedure well. Complications: None              Progress Notes:    Patient arrived to the emergency department in cardiac arrest.  Cardiac arrest was witnessed by EMS. Prior to arrival the emergency department EMS administered 3 doses of epinephrine, performed intubation, and placed an interosseous line in the left tibia. Patient had complaint of shortness of breath prior to cardiac arrest.  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 Estimation was 40% of patient's lung capacity was removed. This is per care everywhere notes from [place] clinic.   In the emergency department after arrival of EMS central line was placed for administration of medications in left femoral vein. There was an attempt on the right side that was unsuccessful. Left side was accessed by attending physician. Interosseous line remained functional.  Chest x-ray showed good positioning of ET tube and large hemopneumothorax on right side. Right-sided chest tube was placed. A 20 Armenian chest tube was inserted with good return. Initial output was 1500 cc of blood. Patient's oxygenation improved following chest tube placement. ROSC was achieved. Patient remained hypotensive with tachycardia. Bedside ultrasound showed no pericardial effusion. Family was updated. Pulses were lost again in the department. Multiple rounds of epinephrine were administered. ROSC was again achieved. Patient was also given bicarb calcium. ABGs showed severe acidemia. Blood pressure remained low and pressors were started. Initial administration of Levophed was not sufficient. Increased dosing of Levophed. Added epinephrine and increased dose of epinephrine. Vasopressin was also added and increased dosing of vasopressin. All 3 pressors were maxed on dosing in the emergency department. This showed little improvement of blood pressure. Since lab values show elevation in potassium. Patient was receiving bicarb and calcium as well as fluids in the emergency department. He was not able to be taken for dialysis due to patient's critical nature. Possibility of tumor lysis syndrome was considered. However, it did not change the course of action while in the emergency department. Patient remained critical while in the emergency department. Repeat lab values did show improvement of his potassium following medications given in the emergency department. Patient continued to have multiple episodes of arrest and ROSC again achieved following multiple doses of epinephrine and continued ACLS protocols.   Including the initial arrest presentation by EMS, patient had 6 episodes of arrest in the emergency department. He received multiple doses of medications including bicarbonate, bicarb drip, calcium, magnesium, lidocaine, and pressors in the emergency department. Patient was also found to be in PEA, asystole, and ventricular fibrillation, as well as torsades. Following torsades patient was given magnesium. For the ventricular fibrillation and torsades patient was defibrillated successfully. Patient was defibrillated 3 times in the emergency department. This is following for defibrillations in the field by EMS. Cardiothoracic surgery was contacted they recommended that [facility] be contacted since patient's initial surgery was performed there. [facility] accepted transfer patient. Unfortunately, helicopter was not able to be sent due to weather conditions. Ground transport was sent with mobile ICU. Critical care consult was sent out for additional help. Recommended cryoprecipitate for patient's continued bleeding. Patient had greater than 5 L of blood out during time in the emergency department. This was out through chest tube. Patient received 8 units of packed red blood cells, 4 units of fresh frozen plasma, and 1 unit of platelets. Additional platelets were ordered, but not available at this facility. Platelets were to be sent from [place] facility. However, mobile intensive care unit arrived prior to arrival of those platelets. Transfer was not delayed to wait for platelets. Mobile intensive care unit was sent with additional blood products, per precipitate, vasopressors, and other medications. Patient was transported out of the emergency department by 1600. Family was kept updated multiple times by resident and attending physicians throughout the day. Family agreed with plan with transfer.               Counseling:                I have spoken with the spouse and family regarding the patient's treatment/condition at this time. --------------------------------------- IMPRESSION & DISPOSITION --------------------------------                    IMPRESSION:      1. Cardiac arrest ([address]        2. Hemopneumothorax on right        3. Hypotension, unspecified hypotension type        4. Hypovolemia        5. Hyperkalemia                  This patient's ED course included: a personal history and physicial examination, re-evaluation prior to disposition, multiple bedside re-evaluations, IV medications, cardiac monitoring, continuous pulse oximetry and complex medical decision making and emergency management         This patient has improved, been closely monitored and initially deteriorated, but then stabilized during their ED course. Please note that the withdrawal or failure to initiate urgent interventions for this patient would likely result in a life threatening deterioration or permanent disability. Systems at risk for deterioration include: cardiovascular, neuro. Accordingly this patient received 240 minutes of critical care time, excluding separately billable procedures. DISPOSITION:    Disposition: Transfer to [facility].     Patient condition is critical.              Travis Bales DO    Resident    06/18/19 [id number]                      Cosigned by: Miguel Angel Walls DO at 6/18/2019  7:15 AM               Revision History                                                                       Miguel Angel Walls DO  07/16/19 [id number]

## (undated) DEVICE — DRAPE C ARM UNIV W41XL74IN CLR PLAS XR VELC CLSR POLY STRP

## (undated) DEVICE — PACK,UNIV, II AURORA: Brand: MEDLINE

## (undated) DEVICE — GLOVE ORANGE PI 7   MSG9070

## (undated) DEVICE — PATIENT RETURN ELECTRODE, SINGLE-USE, CONTACT QUALITY MONITORING, ADULT, WITH 9FT CORD, FOR PATIENTS WEIGING OVER 33LBS. (15KG): Brand: MEGADYNE

## (undated) DEVICE — LABEL MED 4 IN SURG PANEL W/ PEN STRL

## (undated) DEVICE — SURGICAL PROCEDURE PACK BASIC

## (undated) DEVICE — SUTURE BOOTIES, YELLOW, STERILE, 5 PAIR/PAD; 5 PADS/BOX: Brand: KEY SURGICAL SUTURE BOOTIES

## (undated) DEVICE — 1.5L THIN WALL CAN: Brand: CRD

## (undated) DEVICE — SET SURG INSTR MINI VASC

## (undated) DEVICE — STANDARD HYPODERMIC NEEDLE,ALUMINUM HUB: Brand: MONOJECT

## (undated) DEVICE — SKIN AFFIX SURG ADHESIVE 72/CS 0.55ML: Brand: MEDLINE

## (undated) DEVICE — MAGNETIC INSTR DRAPE 20X16: Brand: MEDLINE INDUSTRIES, INC.

## (undated) DEVICE — INTENDED FOR TISSUE SEPARATION, AND OTHER PROCEDURES THAT REQUIRE A SHARP SURGICAL BLADE TO PUNCTURE OR CUT.: Brand: BARD-PARKER ® STAINLESS STEEL BLADES

## (undated) DEVICE — GOWN,SIRUS,FABRNF,L,20/CS: Brand: MEDLINE

## (undated) DEVICE — STERILE LATEX POWDER FREE SURGICAL GLOVES WITH HYDROGEL COATING: Brand: PROTEXIS

## (undated) DEVICE — CONTROL SYRINGE LUER-LOCK TIP: Brand: MONOJECT